# Patient Record
Sex: MALE | Race: WHITE | Employment: OTHER | URBAN - METROPOLITAN AREA
[De-identification: names, ages, dates, MRNs, and addresses within clinical notes are randomized per-mention and may not be internally consistent; named-entity substitution may affect disease eponyms.]

---

## 2017-01-03 ENCOUNTER — PROCEDURE VISIT (OUTPATIENT)
Dept: PHYSICAL MEDICINE AND REHAB | Age: 72
End: 2017-01-03

## 2017-01-03 DIAGNOSIS — M79.10 MYALGIA: Primary | ICD-10-CM

## 2017-01-03 PROCEDURE — 20552 NJX 1/MLT TRIGGER POINT 1/2: CPT | Performed by: PHYSICAL MEDICINE & REHABILITATION

## 2017-01-10 DIAGNOSIS — M79.10 MYALGIA: ICD-10-CM

## 2017-01-10 DIAGNOSIS — M79.641 PAIN IN BOTH HANDS: ICD-10-CM

## 2017-01-10 DIAGNOSIS — M54.40 CHRONIC BILATERAL LOW BACK PAIN WITH SCIATICA, SCIATICA LATERALITY UNSPECIFIED: ICD-10-CM

## 2017-01-10 DIAGNOSIS — M79.642 PAIN IN BOTH HANDS: ICD-10-CM

## 2017-01-10 DIAGNOSIS — G89.29 CHRONIC BILATERAL LOW BACK PAIN WITH SCIATICA, SCIATICA LATERALITY UNSPECIFIED: ICD-10-CM

## 2017-01-10 RX ORDER — IBUPROFEN 800 MG/1
TABLET ORAL
Qty: 90 TABLET | Refills: 0 | Status: SHIPPED | OUTPATIENT
Start: 2017-01-10 | End: 2017-02-13 | Stop reason: SDUPTHER

## 2017-01-24 ENCOUNTER — OFFICE VISIT (OUTPATIENT)
Dept: PHYSICAL MEDICINE AND REHAB | Age: 72
End: 2017-01-24

## 2017-01-24 VITALS
SYSTOLIC BLOOD PRESSURE: 168 MMHG | WEIGHT: 190 LBS | BODY MASS INDEX: 28.79 KG/M2 | HEIGHT: 68 IN | DIASTOLIC BLOOD PRESSURE: 90 MMHG

## 2017-01-24 DIAGNOSIS — M79.10 MYALGIA: ICD-10-CM

## 2017-01-24 DIAGNOSIS — M70.60 GREATER TROCHANTERIC BURSITIS, UNSPECIFIED LATERALITY: Chronic | ICD-10-CM

## 2017-01-24 DIAGNOSIS — M51.36 DDD (DEGENERATIVE DISC DISEASE), LUMBAR: ICD-10-CM

## 2017-01-24 DIAGNOSIS — M50.20 DISPLACEMENT OF CERVICAL INTERVERTEBRAL DISC WITHOUT MYELOPATHY: ICD-10-CM

## 2017-01-24 DIAGNOSIS — M51.26 DISPLACEMENT OF LUMBAR INTERVERTEBRAL DISC WITHOUT MYELOPATHY: Primary | ICD-10-CM

## 2017-01-24 DIAGNOSIS — Z79.899 HIGH RISK MEDICATION USE: ICD-10-CM

## 2017-01-24 DIAGNOSIS — Z79.899 HIGH RISK MEDICATION USE: Chronic | ICD-10-CM

## 2017-01-24 DIAGNOSIS — M54.12 CERVICAL RADICULOPATHY AT C8: ICD-10-CM

## 2017-01-24 PROCEDURE — 1123F ACP DISCUSS/DSCN MKR DOCD: CPT | Performed by: PHYSICAL MEDICINE & REHABILITATION

## 2017-01-24 PROCEDURE — 4040F PNEUMOC VAC/ADMIN/RCVD: CPT | Performed by: PHYSICAL MEDICINE & REHABILITATION

## 2017-01-24 PROCEDURE — G8427 DOCREV CUR MEDS BY ELIG CLIN: HCPCS | Performed by: PHYSICAL MEDICINE & REHABILITATION

## 2017-01-24 PROCEDURE — 99214 OFFICE O/P EST MOD 30 MIN: CPT | Performed by: PHYSICAL MEDICINE & REHABILITATION

## 2017-01-24 PROCEDURE — 3017F COLORECTAL CA SCREEN DOC REV: CPT | Performed by: PHYSICAL MEDICINE & REHABILITATION

## 2017-01-24 PROCEDURE — 1036F TOBACCO NON-USER: CPT | Performed by: PHYSICAL MEDICINE & REHABILITATION

## 2017-01-24 PROCEDURE — G8484 FLU IMMUNIZE NO ADMIN: HCPCS | Performed by: PHYSICAL MEDICINE & REHABILITATION

## 2017-01-24 PROCEDURE — G8420 CALC BMI NORM PARAMETERS: HCPCS | Performed by: PHYSICAL MEDICINE & REHABILITATION

## 2017-01-24 RX ORDER — METAXALONE 800 MG/1
800 TABLET ORAL 3 TIMES DAILY PRN
Qty: 90 TABLET | Refills: 1 | Status: SHIPPED | OUTPATIENT
Start: 2017-01-24 | End: 2017-02-28

## 2017-01-24 RX ORDER — OXYCODONE AND ACETAMINOPHEN 10; 325 MG/1; MG/1
TABLET ORAL
Qty: 60 TABLET | Refills: 0 | Status: SHIPPED | OUTPATIENT
Start: 2017-01-24 | End: 2017-01-24 | Stop reason: SDUPTHER

## 2017-01-24 RX ORDER — OXYCODONE AND ACETAMINOPHEN 10; 325 MG/1; MG/1
TABLET ORAL
Qty: 90 TABLET | Refills: 0 | Status: SHIPPED | OUTPATIENT
Start: 2017-01-24 | End: 2017-02-21 | Stop reason: SDUPTHER

## 2017-01-24 ASSESSMENT — ENCOUNTER SYMPTOMS
DIARRHEA: 0
NAUSEA: 0
CONSTIPATION: 0
VOMITING: 0
EYES NEGATIVE: 1
BACK PAIN: 1
ALLERGIC/IMMUNOLOGIC NEGATIVE: 1
RESPIRATORY NEGATIVE: 1

## 2017-01-27 ENCOUNTER — TELEPHONE (OUTPATIENT)
Dept: PHYSICAL MEDICINE AND REHAB | Age: 72
End: 2017-01-27

## 2017-01-28 ENCOUNTER — OFFICE VISIT (OUTPATIENT)
Dept: PHYSICAL MEDICINE AND REHAB | Age: 72
End: 2017-01-28

## 2017-01-28 DIAGNOSIS — M47.817 LUMBOSACRAL SPONDYLOSIS WITHOUT MYELOPATHY: Primary | ICD-10-CM

## 2017-01-28 DIAGNOSIS — M47.812 CERVICAL SPONDYLOSIS WITHOUT MYELOPATHY: ICD-10-CM

## 2017-01-28 DIAGNOSIS — M54.12 C7 RADICULOPATHY: ICD-10-CM

## 2017-01-28 DIAGNOSIS — M51.26 DISPLACEMENT OF LUMBAR INTERVERTEBRAL DISC WITHOUT MYELOPATHY: ICD-10-CM

## 2017-01-28 DIAGNOSIS — M54.12 CERVICAL RADICULOPATHY AT C8: ICD-10-CM

## 2017-01-28 DIAGNOSIS — M50.20 DISPLACEMENT OF CERVICAL INTERVERTEBRAL DISC WITHOUT MYELOPATHY: ICD-10-CM

## 2017-01-28 PROCEDURE — 95886 MUSC TEST DONE W/N TEST COMP: CPT | Performed by: PHYSICAL MEDICINE & REHABILITATION

## 2017-01-28 PROCEDURE — 95912 NRV CNDJ TEST 11-12 STUDIES: CPT | Performed by: PHYSICAL MEDICINE & REHABILITATION

## 2017-01-28 PROCEDURE — 1036F TOBACCO NON-USER: CPT | Performed by: PHYSICAL MEDICINE & REHABILITATION

## 2017-01-30 RX ORDER — CYCLOBENZAPRINE HCL 10 MG
TABLET ORAL
Qty: 60 TABLET | Refills: 1 | Status: SHIPPED | OUTPATIENT
Start: 2017-01-30 | End: 2017-02-21 | Stop reason: SDUPTHER

## 2017-02-09 ENCOUNTER — PROCEDURE VISIT (OUTPATIENT)
Dept: PHYSICAL MEDICINE AND REHAB | Age: 72
End: 2017-02-09

## 2017-02-09 VITALS — SYSTOLIC BLOOD PRESSURE: 122 MMHG | DIASTOLIC BLOOD PRESSURE: 70 MMHG

## 2017-02-09 DIAGNOSIS — M25.522 ELBOW PAIN, LEFT: Primary | ICD-10-CM

## 2017-02-09 PROCEDURE — 20605 DRAIN/INJ JOINT/BURSA W/O US: CPT | Performed by: PHYSICAL MEDICINE & REHABILITATION

## 2017-02-09 PROCEDURE — 1036F TOBACCO NON-USER: CPT | Performed by: PHYSICAL MEDICINE & REHABILITATION

## 2017-02-13 DIAGNOSIS — M79.642 PAIN IN BOTH HANDS: ICD-10-CM

## 2017-02-13 DIAGNOSIS — M79.10 MYALGIA: ICD-10-CM

## 2017-02-13 DIAGNOSIS — G89.29 CHRONIC BILATERAL LOW BACK PAIN WITH SCIATICA, SCIATICA LATERALITY UNSPECIFIED: ICD-10-CM

## 2017-02-13 DIAGNOSIS — M79.641 PAIN IN BOTH HANDS: ICD-10-CM

## 2017-02-13 DIAGNOSIS — M54.40 CHRONIC BILATERAL LOW BACK PAIN WITH SCIATICA, SCIATICA LATERALITY UNSPECIFIED: ICD-10-CM

## 2017-02-14 RX ORDER — IBUPROFEN 800 MG/1
TABLET ORAL
Qty: 90 TABLET | Refills: 0 | Status: SHIPPED | OUTPATIENT
Start: 2017-02-14 | End: 2017-05-10 | Stop reason: SDUPTHER

## 2017-02-16 PROBLEM — G56.20 TARDY ULNAR NERVE PALSY: Status: ACTIVE | Noted: 2017-02-16

## 2017-02-21 DIAGNOSIS — M51.36 DDD (DEGENERATIVE DISC DISEASE), LUMBAR: ICD-10-CM

## 2017-02-21 DIAGNOSIS — Z79.899 HIGH RISK MEDICATION USE: Chronic | ICD-10-CM

## 2017-02-21 DIAGNOSIS — M54.12 CERVICAL RADICULOPATHY AT C8: ICD-10-CM

## 2017-02-21 DIAGNOSIS — M50.20 DISPLACEMENT OF CERVICAL INTERVERTEBRAL DISC WITHOUT MYELOPATHY: ICD-10-CM

## 2017-02-21 DIAGNOSIS — Z79.899 HIGH RISK MEDICATION USE: ICD-10-CM

## 2017-02-21 RX ORDER — CYCLOBENZAPRINE HCL 10 MG
TABLET ORAL
Qty: 60 TABLET | Refills: 1 | Status: SHIPPED | OUTPATIENT
Start: 2017-02-21 | End: 2017-04-24 | Stop reason: SDUPTHER

## 2017-02-21 RX ORDER — OXYCODONE AND ACETAMINOPHEN 10; 325 MG/1; MG/1
TABLET ORAL
Qty: 90 TABLET | Refills: 0 | Status: SHIPPED | OUTPATIENT
Start: 2017-02-21 | End: 2017-02-28 | Stop reason: SDUPTHER

## 2017-02-28 ENCOUNTER — OFFICE VISIT (OUTPATIENT)
Dept: PHYSICAL MEDICINE AND REHAB | Age: 72
End: 2017-02-28

## 2017-02-28 VITALS
DIASTOLIC BLOOD PRESSURE: 80 MMHG | SYSTOLIC BLOOD PRESSURE: 130 MMHG | BODY MASS INDEX: 28.04 KG/M2 | HEIGHT: 68 IN | WEIGHT: 185 LBS

## 2017-02-28 DIAGNOSIS — Z79.899 HIGH RISK MEDICATION USE: ICD-10-CM

## 2017-02-28 DIAGNOSIS — M50.20 DISPLACEMENT OF CERVICAL INTERVERTEBRAL DISC WITHOUT MYELOPATHY: ICD-10-CM

## 2017-02-28 DIAGNOSIS — M70.60 GREATER TROCHANTERIC BURSITIS, UNSPECIFIED LATERALITY: Primary | Chronic | ICD-10-CM

## 2017-02-28 DIAGNOSIS — G56.22 ULNAR NEUROPATHY OF LEFT UPPER EXTREMITY: ICD-10-CM

## 2017-02-28 DIAGNOSIS — M47.817 LUMBOSACRAL SPONDYLOSIS WITHOUT MYELOPATHY: ICD-10-CM

## 2017-02-28 DIAGNOSIS — M79.641 PAIN IN BOTH HANDS: ICD-10-CM

## 2017-02-28 DIAGNOSIS — M79.642 PAIN IN BOTH HANDS: ICD-10-CM

## 2017-02-28 DIAGNOSIS — Z79.899 HIGH RISK MEDICATION USE: Chronic | ICD-10-CM

## 2017-02-28 DIAGNOSIS — M51.36 DDD (DEGENERATIVE DISC DISEASE), LUMBAR: ICD-10-CM

## 2017-02-28 DIAGNOSIS — M54.12 CERVICAL RADICULOPATHY AT C8: ICD-10-CM

## 2017-02-28 DIAGNOSIS — M79.10 MYALGIA: ICD-10-CM

## 2017-02-28 DIAGNOSIS — G56.20: ICD-10-CM

## 2017-02-28 DIAGNOSIS — M54.12 C7 RADICULOPATHY: ICD-10-CM

## 2017-02-28 PROCEDURE — G8484 FLU IMMUNIZE NO ADMIN: HCPCS | Performed by: PHYSICAL MEDICINE & REHABILITATION

## 2017-02-28 PROCEDURE — 99214 OFFICE O/P EST MOD 30 MIN: CPT | Performed by: PHYSICAL MEDICINE & REHABILITATION

## 2017-02-28 PROCEDURE — 1123F ACP DISCUSS/DSCN MKR DOCD: CPT | Performed by: PHYSICAL MEDICINE & REHABILITATION

## 2017-02-28 PROCEDURE — 3017F COLORECTAL CA SCREEN DOC REV: CPT | Performed by: PHYSICAL MEDICINE & REHABILITATION

## 2017-02-28 PROCEDURE — 4040F PNEUMOC VAC/ADMIN/RCVD: CPT | Performed by: PHYSICAL MEDICINE & REHABILITATION

## 2017-02-28 PROCEDURE — G8420 CALC BMI NORM PARAMETERS: HCPCS | Performed by: PHYSICAL MEDICINE & REHABILITATION

## 2017-02-28 PROCEDURE — 4004F PT TOBACCO SCREEN RCVD TLK: CPT | Performed by: PHYSICAL MEDICINE & REHABILITATION

## 2017-02-28 PROCEDURE — G8427 DOCREV CUR MEDS BY ELIG CLIN: HCPCS | Performed by: PHYSICAL MEDICINE & REHABILITATION

## 2017-02-28 RX ORDER — OXYCODONE AND ACETAMINOPHEN 10; 325 MG/1; MG/1
TABLET ORAL
Qty: 90 TABLET | Refills: 0 | Status: SHIPPED | OUTPATIENT
Start: 2017-02-28 | End: 2017-04-24 | Stop reason: SDUPTHER

## 2017-02-28 ASSESSMENT — ENCOUNTER SYMPTOMS
RESPIRATORY NEGATIVE: 1
VOMITING: 0
EYES NEGATIVE: 1
DIARRHEA: 0
ALLERGIC/IMMUNOLOGIC NEGATIVE: 1
BACK PAIN: 1
ABDOMINAL PAIN: 0
NAUSEA: 0
CONSTIPATION: 0

## 2017-03-17 ENCOUNTER — HOSPITAL ENCOUNTER (OUTPATIENT)
Dept: PREADMISSION TESTING | Age: 72
Discharge: HOME | End: 2017-03-17
Payer: MEDICARE

## 2017-03-17 ENCOUNTER — ANESTHESIA EVENT (OUTPATIENT)
Dept: OPERATING ROOM | Age: 72
End: 2017-03-17
Payer: MEDICARE

## 2017-03-17 VITALS
DIASTOLIC BLOOD PRESSURE: 90 MMHG | BODY MASS INDEX: 30.23 KG/M2 | TEMPERATURE: 97 F | RESPIRATION RATE: 14 BRPM | HEART RATE: 72 BPM | HEIGHT: 67 IN | SYSTOLIC BLOOD PRESSURE: 177 MMHG | OXYGEN SATURATION: 95 % | WEIGHT: 192.6 LBS

## 2017-03-17 LAB
ANION GAP SERPL CALCULATED.3IONS-SCNC: 15 MEQ/L (ref 7–13)
BUN BLDV-MCNC: 11 MG/DL (ref 8–23)
CALCIUM SERPL-MCNC: 9.3 MG/DL (ref 8.6–10.2)
CHLORIDE BLD-SCNC: 105 MEQ/L (ref 98–107)
CO2: 22 MEQ/L (ref 22–29)
CREAT SERPL-MCNC: 0.66 MG/DL (ref 0.7–1.2)
EKG ATRIAL RATE: 57 BPM
EKG P AXIS: 25 DEGREES
EKG P-R INTERVAL: 168 MS
EKG Q-T INTERVAL: 460 MS
EKG QRS DURATION: 146 MS
EKG QTC CALCULATION (BAZETT): 447 MS
EKG R AXIS: -21 DEGREES
EKG T AXIS: 3 DEGREES
EKG VENTRICULAR RATE: 57 BPM
GFR AFRICAN AMERICAN: >60
GFR NON-AFRICAN AMERICAN: >60
GLUCOSE BLD-MCNC: 97 MG/DL (ref 74–109)
HCT VFR BLD CALC: 41 % (ref 42–52)
HEMOGLOBIN: 13.8 G/DL (ref 14–18)
MCH RBC QN AUTO: 30.2 PG (ref 27–31.3)
MCHC RBC AUTO-ENTMCNC: 33.7 % (ref 33–37)
MCV RBC AUTO: 89.9 FL (ref 80–100)
PDW BLD-RTO: 13.7 % (ref 11.5–14.5)
PLATELET # BLD: 177 K/UL (ref 130–400)
POTASSIUM SERPL-SCNC: 3.9 MEQ/L (ref 3.5–5.1)
RBC # BLD: 4.56 M/UL (ref 4.7–6.1)
SODIUM BLD-SCNC: 142 MEQ/L (ref 132–144)
WBC # BLD: 5.4 K/UL (ref 4.8–10.8)

## 2017-03-17 PROCEDURE — 85027 COMPLETE CBC AUTOMATED: CPT

## 2017-03-17 PROCEDURE — 80048 BASIC METABOLIC PNL TOTAL CA: CPT

## 2017-03-17 PROCEDURE — 93005 ELECTROCARDIOGRAM TRACING: CPT

## 2017-03-17 RX ORDER — LIDOCAINE HYDROCHLORIDE 10 MG/ML
1 INJECTION, SOLUTION EPIDURAL; INFILTRATION; INTRACAUDAL; PERINEURAL
Status: CANCELLED | OUTPATIENT
Start: 2017-03-17 | End: 2017-03-17

## 2017-03-17 RX ORDER — SODIUM CHLORIDE, SODIUM LACTATE, POTASSIUM CHLORIDE, CALCIUM CHLORIDE 600; 310; 30; 20 MG/100ML; MG/100ML; MG/100ML; MG/100ML
INJECTION, SOLUTION INTRAVENOUS CONTINUOUS
Status: CANCELLED | OUTPATIENT
Start: 2017-03-17

## 2017-03-17 RX ORDER — SODIUM CHLORIDE, SODIUM LACTATE, POTASSIUM CHLORIDE, CALCIUM CHLORIDE 600; 310; 30; 20 MG/100ML; MG/100ML; MG/100ML; MG/100ML
INJECTION, SOLUTION INTRAVENOUS CONTINUOUS
Status: CANCELLED | OUTPATIENT
Start: 2017-03-20

## 2017-03-17 RX ORDER — SODIUM CHLORIDE 0.9 % (FLUSH) 0.9 %
10 SYRINGE (ML) INJECTION PRN
Status: CANCELLED | OUTPATIENT
Start: 2017-03-17

## 2017-03-17 RX ORDER — SODIUM CHLORIDE 0.9 % (FLUSH) 0.9 %
10 SYRINGE (ML) INJECTION EVERY 12 HOURS SCHEDULED
Status: CANCELLED | OUTPATIENT
Start: 2017-03-17

## 2017-03-20 ENCOUNTER — HOSPITAL ENCOUNTER (OUTPATIENT)
Age: 72
Discharge: HOME | End: 2017-03-20
Attending: NEUROLOGICAL SURGERY | Admitting: NEUROLOGICAL SURGERY
Payer: MEDICARE

## 2017-03-20 ENCOUNTER — ANESTHESIA (OUTPATIENT)
Dept: OPERATING ROOM | Age: 72
End: 2017-03-20
Payer: MEDICARE

## 2017-03-20 VITALS — SYSTOLIC BLOOD PRESSURE: 108 MMHG | DIASTOLIC BLOOD PRESSURE: 57 MMHG | OXYGEN SATURATION: 100 %

## 2017-03-20 VITALS
HEART RATE: 70 BPM | DIASTOLIC BLOOD PRESSURE: 68 MMHG | OXYGEN SATURATION: 94 % | RESPIRATION RATE: 16 BRPM | SYSTOLIC BLOOD PRESSURE: 125 MMHG | TEMPERATURE: 97.4 F

## 2017-03-20 PROCEDURE — 7100000001 HC PACU RECOVERY - ADDTL 15 MIN: Performed by: NEUROLOGICAL SURGERY

## 2017-03-20 PROCEDURE — 3600000012 HC SURGERY LEVEL 2 ADDTL 15MIN: Performed by: NEUROLOGICAL SURGERY

## 2017-03-20 PROCEDURE — 7100000010 HC PHASE II RECOVERY - FIRST 15 MIN: Performed by: NEUROLOGICAL SURGERY

## 2017-03-20 PROCEDURE — 2500000003 HC RX 250 WO HCPCS: Performed by: NEUROLOGICAL SURGERY

## 2017-03-20 PROCEDURE — 3600000002 HC SURGERY LEVEL 2 BASE: Performed by: NEUROLOGICAL SURGERY

## 2017-03-20 PROCEDURE — 7100000011 HC PHASE II RECOVERY - ADDTL 15 MIN: Performed by: NEUROLOGICAL SURGERY

## 2017-03-20 PROCEDURE — 6360000002 HC RX W HCPCS: Performed by: NURSE ANESTHETIST, CERTIFIED REGISTERED

## 2017-03-20 PROCEDURE — 2580000003 HC RX 258: Performed by: NEUROLOGICAL SURGERY

## 2017-03-20 PROCEDURE — 2500000003 HC RX 250 WO HCPCS: Performed by: STUDENT IN AN ORGANIZED HEALTH CARE EDUCATION/TRAINING PROGRAM

## 2017-03-20 PROCEDURE — 2500000003 HC RX 250 WO HCPCS: Performed by: NURSE ANESTHETIST, CERTIFIED REGISTERED

## 2017-03-20 PROCEDURE — 6360000002 HC RX W HCPCS: Performed by: ANESTHESIOLOGY

## 2017-03-20 PROCEDURE — 3700000001 HC ADD 15 MINUTES (ANESTHESIA): Performed by: NEUROLOGICAL SURGERY

## 2017-03-20 PROCEDURE — 6360000002 HC RX W HCPCS: Performed by: NEUROLOGICAL SURGERY

## 2017-03-20 PROCEDURE — 3700000000 HC ANESTHESIA ATTENDED CARE: Performed by: NEUROLOGICAL SURGERY

## 2017-03-20 PROCEDURE — 7100000000 HC PACU RECOVERY - FIRST 15 MIN: Performed by: NEUROLOGICAL SURGERY

## 2017-03-20 PROCEDURE — 2580000003 HC RX 258: Performed by: NURSE ANESTHETIST, CERTIFIED REGISTERED

## 2017-03-20 RX ORDER — LOSARTAN POTASSIUM 50 MG/1
100 TABLET ORAL DAILY
Status: DISCONTINUED | OUTPATIENT
Start: 2017-03-20 | End: 2017-03-20 | Stop reason: HOSPADM

## 2017-03-20 RX ORDER — ACETAMINOPHEN 325 MG/1
650 TABLET ORAL EVERY 4 HOURS PRN
Status: DISCONTINUED | OUTPATIENT
Start: 2017-03-20 | End: 2017-03-20 | Stop reason: HOSPADM

## 2017-03-20 RX ORDER — OXYCODONE HYDROCHLORIDE AND ACETAMINOPHEN 5; 325 MG/1; MG/1
2 TABLET ORAL EVERY 4 HOURS PRN
Status: DISCONTINUED | OUTPATIENT
Start: 2017-03-20 | End: 2017-03-20 | Stop reason: HOSPADM

## 2017-03-20 RX ORDER — ONDANSETRON 2 MG/ML
4 INJECTION INTRAMUSCULAR; INTRAVENOUS
Status: DISCONTINUED | OUTPATIENT
Start: 2017-03-20 | End: 2017-03-20 | Stop reason: HOSPADM

## 2017-03-20 RX ORDER — SODIUM CHLORIDE, SODIUM LACTATE, POTASSIUM CHLORIDE, CALCIUM CHLORIDE 600; 310; 30; 20 MG/100ML; MG/100ML; MG/100ML; MG/100ML
INJECTION, SOLUTION INTRAVENOUS CONTINUOUS PRN
Status: DISCONTINUED | OUTPATIENT
Start: 2017-03-20 | End: 2017-03-20 | Stop reason: SDUPTHER

## 2017-03-20 RX ORDER — EPHEDRINE SULFATE 50 MG/ML
INJECTION, SOLUTION INTRAVENOUS PRN
Status: DISCONTINUED | OUTPATIENT
Start: 2017-03-20 | End: 2017-03-20 | Stop reason: SDUPTHER

## 2017-03-20 RX ORDER — FENTANYL CITRATE 50 UG/ML
50 INJECTION, SOLUTION INTRAMUSCULAR; INTRAVENOUS EVERY 5 MIN PRN
Status: DISCONTINUED | OUTPATIENT
Start: 2017-03-20 | End: 2017-03-20 | Stop reason: HOSPADM

## 2017-03-20 RX ORDER — ROCURONIUM BROMIDE 10 MG/ML
INJECTION, SOLUTION INTRAVENOUS PRN
Status: DISCONTINUED | OUTPATIENT
Start: 2017-03-20 | End: 2017-03-20 | Stop reason: SDUPTHER

## 2017-03-20 RX ORDER — DEXAMETHASONE SODIUM PHOSPHATE 4 MG/ML
INJECTION, SOLUTION INTRA-ARTICULAR; INTRALESIONAL; INTRAMUSCULAR; INTRAVENOUS; SOFT TISSUE PRN
Status: DISCONTINUED | OUTPATIENT
Start: 2017-03-20 | End: 2017-03-20 | Stop reason: SDUPTHER

## 2017-03-20 RX ORDER — MIDAZOLAM HYDROCHLORIDE 1 MG/ML
INJECTION INTRAMUSCULAR; INTRAVENOUS PRN
Status: DISCONTINUED | OUTPATIENT
Start: 2017-03-20 | End: 2017-03-20 | Stop reason: SDUPTHER

## 2017-03-20 RX ORDER — SUCCINYLCHOLINE CHLORIDE 20 MG/ML
INJECTION INTRAMUSCULAR; INTRAVENOUS PRN
Status: DISCONTINUED | OUTPATIENT
Start: 2017-03-20 | End: 2017-03-20 | Stop reason: SDUPTHER

## 2017-03-20 RX ORDER — ONDANSETRON 2 MG/ML
INJECTION INTRAMUSCULAR; INTRAVENOUS PRN
Status: DISCONTINUED | OUTPATIENT
Start: 2017-03-20 | End: 2017-03-20 | Stop reason: SDUPTHER

## 2017-03-20 RX ORDER — SODIUM CHLORIDE, SODIUM LACTATE, POTASSIUM CHLORIDE, CALCIUM CHLORIDE 600; 310; 30; 20 MG/100ML; MG/100ML; MG/100ML; MG/100ML
INJECTION, SOLUTION INTRAVENOUS CONTINUOUS
Status: DISCONTINUED | OUTPATIENT
Start: 2017-03-20 | End: 2017-03-20

## 2017-03-20 RX ORDER — ONDANSETRON 2 MG/ML
4 INJECTION INTRAMUSCULAR; INTRAVENOUS EVERY 6 HOURS PRN
Status: DISCONTINUED | OUTPATIENT
Start: 2017-03-20 | End: 2017-03-20 | Stop reason: HOSPADM

## 2017-03-20 RX ORDER — OXYCODONE AND ACETAMINOPHEN 10; 325 MG/1; MG/1
1 TABLET ORAL 4 TIMES DAILY PRN
Status: DISCONTINUED | OUTPATIENT
Start: 2017-03-20 | End: 2017-03-20 | Stop reason: HOSPADM

## 2017-03-20 RX ORDER — LIDOCAINE HYDROCHLORIDE 20 MG/ML
INJECTION, SOLUTION INFILTRATION; PERINEURAL PRN
Status: DISCONTINUED | OUTPATIENT
Start: 2017-03-20 | End: 2017-03-20

## 2017-03-20 RX ORDER — OXYCODONE HYDROCHLORIDE AND ACETAMINOPHEN 5; 325 MG/1; MG/1
1 TABLET ORAL EVERY 4 HOURS PRN
Status: DISCONTINUED | OUTPATIENT
Start: 2017-03-20 | End: 2017-03-20 | Stop reason: HOSPADM

## 2017-03-20 RX ORDER — FENTANYL CITRATE 50 UG/ML
INJECTION, SOLUTION INTRAMUSCULAR; INTRAVENOUS PRN
Status: DISCONTINUED | OUTPATIENT
Start: 2017-03-20 | End: 2017-03-20 | Stop reason: SDUPTHER

## 2017-03-20 RX ORDER — PROMETHAZINE HYDROCHLORIDE 25 MG/ML
25 INJECTION, SOLUTION INTRAMUSCULAR; INTRAVENOUS
Status: DISCONTINUED | OUTPATIENT
Start: 2017-03-20 | End: 2017-03-20 | Stop reason: HOSPADM

## 2017-03-20 RX ORDER — LIDOCAINE HYDROCHLORIDE AND EPINEPHRINE BITARTRATE 20; .01 MG/ML; MG/ML
INJECTION, SOLUTION SUBCUTANEOUS PRN
Status: DISCONTINUED | OUTPATIENT
Start: 2017-03-20 | End: 2017-03-20 | Stop reason: HOSPADM

## 2017-03-20 RX ORDER — SODIUM CHLORIDE 0.9 % (FLUSH) 0.9 %
10 SYRINGE (ML) INJECTION PRN
Status: DISCONTINUED | OUTPATIENT
Start: 2017-03-20 | End: 2017-03-20 | Stop reason: HOSPADM

## 2017-03-20 RX ORDER — LIDOCAINE HYDROCHLORIDE 10 MG/ML
1 INJECTION, SOLUTION EPIDURAL; INFILTRATION; INTRACAUDAL; PERINEURAL
Status: COMPLETED | OUTPATIENT
Start: 2017-03-20 | End: 2017-03-20

## 2017-03-20 RX ORDER — SODIUM CHLORIDE 0.9 % (FLUSH) 0.9 %
10 SYRINGE (ML) INJECTION EVERY 12 HOURS SCHEDULED
Status: DISCONTINUED | OUTPATIENT
Start: 2017-03-20 | End: 2017-03-20 | Stop reason: HOSPADM

## 2017-03-20 RX ORDER — SODIUM CHLORIDE, SODIUM LACTATE, POTASSIUM CHLORIDE, CALCIUM CHLORIDE 600; 310; 30; 20 MG/100ML; MG/100ML; MG/100ML; MG/100ML
INJECTION, SOLUTION INTRAVENOUS CONTINUOUS
Status: DISCONTINUED | OUTPATIENT
Start: 2017-03-20 | End: 2017-03-20 | Stop reason: HOSPADM

## 2017-03-20 RX ORDER — MAGNESIUM HYDROXIDE 1200 MG/15ML
LIQUID ORAL CONTINUOUS PRN
Status: DISCONTINUED | OUTPATIENT
Start: 2017-03-20 | End: 2017-03-20 | Stop reason: HOSPADM

## 2017-03-20 RX ORDER — LIDOCAINE HYDROCHLORIDE 20 MG/ML
INJECTION, SOLUTION INFILTRATION; PERINEURAL PRN
Status: DISCONTINUED | OUTPATIENT
Start: 2017-03-20 | End: 2017-03-20 | Stop reason: SDUPTHER

## 2017-03-20 RX ORDER — PROPOFOL 10 MG/ML
INJECTION, EMULSION INTRAVENOUS PRN
Status: DISCONTINUED | OUTPATIENT
Start: 2017-03-20 | End: 2017-03-20 | Stop reason: SDUPTHER

## 2017-03-20 RX ADMIN — EPHEDRINE SULFATE 10 MG: 50 INJECTION, SOLUTION INTRAMUSCULAR; INTRAVENOUS; SUBCUTANEOUS at 14:25

## 2017-03-20 RX ADMIN — HYDROMORPHONE HYDROCHLORIDE 0.5 MG: 1 INJECTION, SOLUTION INTRAMUSCULAR; INTRAVENOUS; SUBCUTANEOUS at 15:05

## 2017-03-20 RX ADMIN — SUCCINYLCHOLINE CHLORIDE 120 MG: 20 INJECTION, SOLUTION INTRAMUSCULAR; INTRAVENOUS at 13:35

## 2017-03-20 RX ADMIN — DEXAMETHASONE SODIUM PHOSPHATE 4 MG: 4 INJECTION, SOLUTION INTRAMUSCULAR; INTRAVENOUS at 14:00

## 2017-03-20 RX ADMIN — ONDANSETRON 4 MG: 2 INJECTION, SOLUTION INTRAMUSCULAR; INTRAVENOUS at 14:15

## 2017-03-20 RX ADMIN — ROCURONIUM BROMIDE 20 MG: 10 SOLUTION INTRAVENOUS at 13:40

## 2017-03-20 RX ADMIN — HYDROMORPHONE HYDROCHLORIDE 0.5 MG: 1 INJECTION, SOLUTION INTRAMUSCULAR; INTRAVENOUS; SUBCUTANEOUS at 14:55

## 2017-03-20 RX ADMIN — MIDAZOLAM HYDROCHLORIDE 2 MG: 1 INJECTION, SOLUTION INTRAMUSCULAR; INTRAVENOUS at 13:25

## 2017-03-20 RX ADMIN — EPHEDRINE SULFATE 5 MG: 50 INJECTION, SOLUTION INTRAMUSCULAR; INTRAVENOUS; SUBCUTANEOUS at 13:51

## 2017-03-20 RX ADMIN — SODIUM CHLORIDE, POTASSIUM CHLORIDE, SODIUM LACTATE AND CALCIUM CHLORIDE: 600; 310; 30; 20 INJECTION, SOLUTION INTRAVENOUS at 13:25

## 2017-03-20 RX ADMIN — SODIUM CHLORIDE, POTASSIUM CHLORIDE, SODIUM LACTATE AND CALCIUM CHLORIDE: 600; 310; 30; 20 INJECTION, SOLUTION INTRAVENOUS at 11:25

## 2017-03-20 RX ADMIN — EPHEDRINE SULFATE 10 MG: 50 INJECTION, SOLUTION INTRAMUSCULAR; INTRAVENOUS; SUBCUTANEOUS at 13:50

## 2017-03-20 RX ADMIN — FENTANYL CITRATE 50 MCG: 50 INJECTION, SOLUTION INTRAMUSCULAR; INTRAVENOUS at 13:35

## 2017-03-20 RX ADMIN — SODIUM CHLORIDE, POTASSIUM CHLORIDE, SODIUM LACTATE AND CALCIUM CHLORIDE: 600; 310; 30; 20 INJECTION, SOLUTION INTRAVENOUS at 14:00

## 2017-03-20 RX ADMIN — PROPOFOL 150 MG: 10 INJECTION, EMULSION INTRAVENOUS at 13:35

## 2017-03-20 RX ADMIN — EPHEDRINE SULFATE 5 MG: 50 INJECTION, SOLUTION INTRAMUSCULAR; INTRAVENOUS; SUBCUTANEOUS at 13:56

## 2017-03-20 RX ADMIN — SUGAMMADEX 174 MG: 100 INJECTION, SOLUTION INTRAVENOUS at 14:30

## 2017-03-20 RX ADMIN — EPHEDRINE SULFATE 10 MG: 50 INJECTION, SOLUTION INTRAMUSCULAR; INTRAVENOUS; SUBCUTANEOUS at 13:55

## 2017-03-20 RX ADMIN — FENTANYL CITRATE 50 MCG: 50 INJECTION, SOLUTION INTRAMUSCULAR; INTRAVENOUS at 14:35

## 2017-03-20 RX ADMIN — Medication 2 G: at 13:30

## 2017-03-20 RX ADMIN — LIDOCAINE HYDROCHLORIDE 1 ML: 10 INJECTION, SOLUTION EPIDURAL; INFILTRATION; INTRACAUDAL; PERINEURAL at 11:25

## 2017-03-20 RX ADMIN — ROCURONIUM BROMIDE 10 MG: 10 SOLUTION INTRAVENOUS at 13:35

## 2017-03-20 RX ADMIN — LIDOCAINE HYDROCHLORIDE 100 MG: 20 INJECTION, SOLUTION INFILTRATION; PERINEURAL at 13:35

## 2017-03-20 ASSESSMENT — PAIN SCALES - GENERAL
PAINLEVEL_OUTOF10: 6
PAINLEVEL_OUTOF10: 7
PAINLEVEL_OUTOF10: 3
PAINLEVEL_OUTOF10: 2
PAINLEVEL_OUTOF10: 4

## 2017-03-20 ASSESSMENT — PAIN DESCRIPTION - PAIN TYPE: TYPE: SURGICAL PAIN

## 2017-03-20 ASSESSMENT — PAIN DESCRIPTION - FREQUENCY: FREQUENCY: INTERMITTENT

## 2017-03-20 ASSESSMENT — PAIN - FUNCTIONAL ASSESSMENT: PAIN_FUNCTIONAL_ASSESSMENT: 0-10

## 2017-03-20 ASSESSMENT — PAIN DESCRIPTION - DESCRIPTORS: DESCRIPTORS: THROBBING

## 2017-03-20 ASSESSMENT — PAIN DESCRIPTION - ORIENTATION: ORIENTATION: LEFT

## 2017-03-20 ASSESSMENT — PAIN DESCRIPTION - LOCATION: LOCATION: ARM

## 2017-03-27 ENCOUNTER — TELEPHONE (OUTPATIENT)
Dept: PHYSICAL MEDICINE AND REHAB | Age: 72
End: 2017-03-27

## 2017-04-24 DIAGNOSIS — M51.36 DDD (DEGENERATIVE DISC DISEASE), LUMBAR: ICD-10-CM

## 2017-04-24 DIAGNOSIS — M50.20 DISPLACEMENT OF CERVICAL INTERVERTEBRAL DISC WITHOUT MYELOPATHY: ICD-10-CM

## 2017-04-24 DIAGNOSIS — M54.12 CERVICAL RADICULOPATHY AT C8: ICD-10-CM

## 2017-04-24 DIAGNOSIS — Z79.899 HIGH RISK MEDICATION USE: ICD-10-CM

## 2017-04-24 DIAGNOSIS — Z79.899 HIGH RISK MEDICATION USE: Chronic | ICD-10-CM

## 2017-04-24 RX ORDER — CYCLOBENZAPRINE HCL 10 MG
TABLET ORAL
Qty: 60 TABLET | Refills: 1 | Status: SHIPPED | OUTPATIENT
Start: 2017-04-24 | End: 2017-05-04

## 2017-04-24 RX ORDER — OXYCODONE AND ACETAMINOPHEN 10; 325 MG/1; MG/1
TABLET ORAL
Qty: 90 TABLET | Refills: 0 | Status: SHIPPED | OUTPATIENT
Start: 2017-04-24 | End: 2017-05-10 | Stop reason: SDUPTHER

## 2017-05-10 ENCOUNTER — OFFICE VISIT (OUTPATIENT)
Dept: PHYSICAL MEDICINE AND REHAB | Age: 72
End: 2017-05-10

## 2017-05-10 VITALS
BODY MASS INDEX: 28.95 KG/M2 | DIASTOLIC BLOOD PRESSURE: 58 MMHG | WEIGHT: 191 LBS | HEIGHT: 68 IN | SYSTOLIC BLOOD PRESSURE: 110 MMHG

## 2017-05-10 DIAGNOSIS — M54.2 NECK PAIN: Primary | ICD-10-CM

## 2017-05-10 DIAGNOSIS — M51.36 DDD (DEGENERATIVE DISC DISEASE), LUMBAR: ICD-10-CM

## 2017-05-10 DIAGNOSIS — M25.512 BILATERAL SHOULDER PAIN, UNSPECIFIED CHRONICITY: ICD-10-CM

## 2017-05-10 DIAGNOSIS — Z79.899 HIGH RISK MEDICATION USE: Chronic | ICD-10-CM

## 2017-05-10 DIAGNOSIS — M50.20 DISPLACEMENT OF CERVICAL INTERVERTEBRAL DISC WITHOUT MYELOPATHY: ICD-10-CM

## 2017-05-10 DIAGNOSIS — M79.641 PAIN IN BOTH HANDS: ICD-10-CM

## 2017-05-10 DIAGNOSIS — M54.12 CERVICAL RADICULOPATHY AT C8: ICD-10-CM

## 2017-05-10 DIAGNOSIS — M25.511 BILATERAL SHOULDER PAIN, UNSPECIFIED CHRONICITY: ICD-10-CM

## 2017-05-10 DIAGNOSIS — M79.10 MYALGIA: ICD-10-CM

## 2017-05-10 DIAGNOSIS — Z79.899 HIGH RISK MEDICATION USE: ICD-10-CM

## 2017-05-10 DIAGNOSIS — G89.29 CHRONIC BILATERAL LOW BACK PAIN WITH SCIATICA, SCIATICA LATERALITY UNSPECIFIED: ICD-10-CM

## 2017-05-10 DIAGNOSIS — M79.642 PAIN IN BOTH HANDS: ICD-10-CM

## 2017-05-10 DIAGNOSIS — M54.40 CHRONIC BILATERAL LOW BACK PAIN WITH SCIATICA, SCIATICA LATERALITY UNSPECIFIED: ICD-10-CM

## 2017-05-10 PROCEDURE — 99214 OFFICE O/P EST MOD 30 MIN: CPT | Performed by: PHYSICAL MEDICINE & REHABILITATION

## 2017-05-10 RX ORDER — IBUPROFEN 800 MG/1
TABLET ORAL
Qty: 90 TABLET | Refills: 0 | Status: SHIPPED | OUTPATIENT
Start: 2017-05-10 | End: 2017-06-21 | Stop reason: SDUPTHER

## 2017-05-10 RX ORDER — OXYCODONE AND ACETAMINOPHEN 10; 325 MG/1; MG/1
TABLET ORAL
Qty: 90 TABLET | Refills: 0 | Status: SHIPPED | OUTPATIENT
Start: 2017-05-10 | End: 2017-06-21 | Stop reason: SDUPTHER

## 2017-05-10 ASSESSMENT — ENCOUNTER SYMPTOMS
SHORTNESS OF BREATH: 0
VOMITING: 0
ABDOMINAL PAIN: 0
WHEEZING: 0
CONSTIPATION: 1
NAUSEA: 0
BACK PAIN: 1
EYES NEGATIVE: 1
ALLERGIC/IMMUNOLOGIC NEGATIVE: 1
DIARRHEA: 0
CHEST TIGHTNESS: 0

## 2017-05-11 ENCOUNTER — OFFICE VISIT (OUTPATIENT)
Dept: FAMILY MEDICINE CLINIC | Age: 72
End: 2017-05-11

## 2017-05-11 VITALS
HEART RATE: 78 BPM | DIASTOLIC BLOOD PRESSURE: 84 MMHG | HEIGHT: 68 IN | SYSTOLIC BLOOD PRESSURE: 132 MMHG | WEIGHT: 192 LBS | BODY MASS INDEX: 29.1 KG/M2 | OXYGEN SATURATION: 97 %

## 2017-05-11 DIAGNOSIS — F41.9 ANXIETY: ICD-10-CM

## 2017-05-11 DIAGNOSIS — M51.36 DDD (DEGENERATIVE DISC DISEASE), LUMBAR: ICD-10-CM

## 2017-05-11 DIAGNOSIS — L98.9 SKIN LESION: ICD-10-CM

## 2017-05-11 DIAGNOSIS — I10 ESSENTIAL HYPERTENSION: ICD-10-CM

## 2017-05-11 DIAGNOSIS — E78.5 HYPERLIPIDEMIA, UNSPECIFIED HYPERLIPIDEMIA TYPE: ICD-10-CM

## 2017-05-11 DIAGNOSIS — K21.9 GASTROESOPHAGEAL REFLUX DISEASE WITHOUT ESOPHAGITIS: Primary | ICD-10-CM

## 2017-05-11 DIAGNOSIS — Z11.59 NEED FOR HEPATITIS C SCREENING TEST: ICD-10-CM

## 2017-05-11 PROCEDURE — 99214 OFFICE O/P EST MOD 30 MIN: CPT | Performed by: FAMILY MEDICINE

## 2017-05-11 PROCEDURE — G8510 SCR DEP NEG, NO PLAN REQD: HCPCS | Performed by: FAMILY MEDICINE

## 2017-05-11 PROCEDURE — 3288F FALL RISK ASSESSMENT DOCD: CPT | Performed by: FAMILY MEDICINE

## 2017-05-11 RX ORDER — ATORVASTATIN CALCIUM 20 MG/1
TABLET, FILM COATED ORAL
Qty: 90 TABLET | Refills: 2 | Status: SHIPPED | OUTPATIENT
Start: 2017-05-11 | End: 2018-02-05 | Stop reason: SDUPTHER

## 2017-05-11 ASSESSMENT — PATIENT HEALTH QUESTIONNAIRE - PHQ9
SUM OF ALL RESPONSES TO PHQ QUESTIONS 1-9: 1
1. LITTLE INTEREST OR PLEASURE IN DOING THINGS: 0
2. FEELING DOWN, DEPRESSED OR HOPELESS: 1
SUM OF ALL RESPONSES TO PHQ9 QUESTIONS 1 & 2: 1

## 2017-05-12 DIAGNOSIS — Z11.59 NEED FOR HEPATITIS C SCREENING TEST: ICD-10-CM

## 2017-05-12 DIAGNOSIS — E78.5 HYPERLIPIDEMIA, UNSPECIFIED HYPERLIPIDEMIA TYPE: ICD-10-CM

## 2017-05-12 LAB
ALBUMIN SERPL-MCNC: 4.3 G/DL (ref 3.9–4.9)
ALP BLD-CCNC: 47 U/L (ref 35–104)
ALT SERPL-CCNC: 15 U/L (ref 0–41)
AST SERPL-CCNC: 16 U/L (ref 0–40)
BILIRUB SERPL-MCNC: 0.4 MG/DL (ref 0–1.2)
BILIRUBIN DIRECT: 0 MG/DL (ref 0–0.3)
BILIRUBIN, INDIRECT: 0.4 MG/DL (ref 0–0.6)
CHOLESTEROL, TOTAL: 172 MG/DL (ref 0–199)
HDLC SERPL-MCNC: 53 MG/DL (ref 40–59)
HEPATITIS C ANTIBODY INTERPRETATION: NORMAL
LDL CHOLESTEROL CALCULATED: 96 MG/DL (ref 0–129)
TOTAL PROTEIN: 6.7 G/DL (ref 6.4–8.1)
TRIGL SERPL-MCNC: 117 MG/DL (ref 0–200)

## 2017-05-23 ENCOUNTER — PROCEDURE VISIT (OUTPATIENT)
Dept: PHYSICAL MEDICINE AND REHAB | Age: 72
End: 2017-05-23

## 2017-05-23 DIAGNOSIS — M79.10 MYALGIA: Primary | ICD-10-CM

## 2017-05-23 PROCEDURE — 20553 NJX 1/MLT TRIGGER POINTS 3/>: CPT | Performed by: PHYSICAL MEDICINE & REHABILITATION

## 2017-06-19 ENCOUNTER — OFFICE VISIT (OUTPATIENT)
Dept: FAMILY MEDICINE CLINIC | Age: 72
End: 2017-06-19

## 2017-06-19 VITALS
SYSTOLIC BLOOD PRESSURE: 118 MMHG | BODY MASS INDEX: 28.64 KG/M2 | DIASTOLIC BLOOD PRESSURE: 62 MMHG | HEIGHT: 68 IN | OXYGEN SATURATION: 98 % | HEART RATE: 61 BPM | WEIGHT: 189 LBS

## 2017-06-19 DIAGNOSIS — D23.4 BENIGN NEOPLASM OF SKIN OF NECK: ICD-10-CM

## 2017-06-19 DIAGNOSIS — L57.0 ACTINIC KERATOSES: Primary | ICD-10-CM

## 2017-06-19 PROCEDURE — 11307 SHAVE SKIN LESION 1.1-2.0 CM: CPT | Performed by: FAMILY MEDICINE

## 2017-06-19 PROCEDURE — 17110 DESTRUCTION B9 LES UP TO 14: CPT | Performed by: FAMILY MEDICINE

## 2017-06-19 ASSESSMENT — ENCOUNTER SYMPTOMS
SHORTNESS OF BREATH: 0
ABDOMINAL PAIN: 0

## 2017-06-21 DIAGNOSIS — M54.40 CHRONIC BILATERAL LOW BACK PAIN WITH SCIATICA, SCIATICA LATERALITY UNSPECIFIED: ICD-10-CM

## 2017-06-21 DIAGNOSIS — M79.641 PAIN IN BOTH HANDS: ICD-10-CM

## 2017-06-21 DIAGNOSIS — M51.36 DDD (DEGENERATIVE DISC DISEASE), LUMBAR: ICD-10-CM

## 2017-06-21 DIAGNOSIS — G89.29 CHRONIC BILATERAL LOW BACK PAIN WITH SCIATICA, SCIATICA LATERALITY UNSPECIFIED: ICD-10-CM

## 2017-06-21 DIAGNOSIS — Z79.899 HIGH RISK MEDICATION USE: Chronic | ICD-10-CM

## 2017-06-21 DIAGNOSIS — M79.642 PAIN IN BOTH HANDS: ICD-10-CM

## 2017-06-21 DIAGNOSIS — M54.12 CERVICAL RADICULOPATHY AT C8: ICD-10-CM

## 2017-06-21 DIAGNOSIS — Z79.899 HIGH RISK MEDICATION USE: ICD-10-CM

## 2017-06-21 DIAGNOSIS — M50.20 DISPLACEMENT OF CERVICAL INTERVERTEBRAL DISC WITHOUT MYELOPATHY: ICD-10-CM

## 2017-06-21 DIAGNOSIS — M79.10 MYALGIA: ICD-10-CM

## 2017-06-21 RX ORDER — IBUPROFEN 800 MG/1
TABLET ORAL
Qty: 90 TABLET | Refills: 0 | Status: SHIPPED | OUTPATIENT
Start: 2017-06-21 | End: 2017-07-20 | Stop reason: SDUPTHER

## 2017-06-21 RX ORDER — OXYCODONE AND ACETAMINOPHEN 10; 325 MG/1; MG/1
TABLET ORAL
Qty: 90 TABLET | Refills: 0 | Status: SHIPPED | OUTPATIENT
Start: 2017-06-21 | End: 2017-07-13 | Stop reason: DRUGHIGH

## 2017-06-26 ENCOUNTER — PROCEDURE VISIT (OUTPATIENT)
Dept: PHYSICAL MEDICINE AND REHAB | Age: 72
End: 2017-06-26

## 2017-06-26 DIAGNOSIS — M79.10 MYALGIA: Primary | ICD-10-CM

## 2017-06-26 PROCEDURE — 20553 NJX 1/MLT TRIGGER POINTS 3/>: CPT | Performed by: PHYSICAL MEDICINE & REHABILITATION

## 2017-07-13 ENCOUNTER — OFFICE VISIT (OUTPATIENT)
Dept: PHYSICAL MEDICINE AND REHAB | Age: 72
End: 2017-07-13

## 2017-07-13 VITALS
SYSTOLIC BLOOD PRESSURE: 128 MMHG | BODY MASS INDEX: 28.04 KG/M2 | DIASTOLIC BLOOD PRESSURE: 70 MMHG | HEIGHT: 68 IN | WEIGHT: 185 LBS

## 2017-07-13 DIAGNOSIS — M51.36 DDD (DEGENERATIVE DISC DISEASE), LUMBAR: ICD-10-CM

## 2017-07-13 DIAGNOSIS — G25.81 RLS (RESTLESS LEGS SYNDROME): ICD-10-CM

## 2017-07-13 DIAGNOSIS — M54.12 CERVICAL RADICULOPATHY AT C8: ICD-10-CM

## 2017-07-13 DIAGNOSIS — M50.20 DISPLACEMENT OF CERVICAL INTERVERTEBRAL DISC WITHOUT MYELOPATHY: ICD-10-CM

## 2017-07-13 DIAGNOSIS — M19.09: ICD-10-CM

## 2017-07-13 DIAGNOSIS — Z79.899 HIGH RISK MEDICATION USE: ICD-10-CM

## 2017-07-13 DIAGNOSIS — Z79.899 HIGH RISK MEDICATION USE: Chronic | ICD-10-CM

## 2017-07-13 DIAGNOSIS — F32.A DEPRESSION, ACUTE: ICD-10-CM

## 2017-07-13 DIAGNOSIS — M70.60 GREATER TROCHANTERIC BURSITIS, UNSPECIFIED LATERALITY: Primary | Chronic | ICD-10-CM

## 2017-07-13 DIAGNOSIS — M47.812 CERVICAL SPONDYLOSIS WITHOUT MYELOPATHY: ICD-10-CM

## 2017-07-13 DIAGNOSIS — M79.10 MYALGIA: ICD-10-CM

## 2017-07-13 DIAGNOSIS — M47.817 LUMBOSACRAL SPONDYLOSIS WITHOUT MYELOPATHY: ICD-10-CM

## 2017-07-13 LAB
AMPHETAMINE SCREEN, URINE: NORMAL
BARBITURATE SCREEN URINE: NORMAL
BENZODIAZEPINE SCREEN, URINE: NORMAL
CANNABINOID SCREEN URINE: NORMAL
COCAINE METABOLITE SCREEN URINE: NORMAL
Lab: NORMAL
OPIATE SCREEN URINE: NORMAL
PHENCYCLIDINE SCREEN URINE: NORMAL

## 2017-07-13 PROCEDURE — 99215 OFFICE O/P EST HI 40 MIN: CPT | Performed by: PHYSICAL MEDICINE & REHABILITATION

## 2017-07-13 RX ORDER — ROPINIROLE 0.25 MG/1
0.25 TABLET, FILM COATED ORAL NIGHTLY
Qty: 90 TABLET | Refills: 3 | Status: SHIPPED | OUTPATIENT
Start: 2017-07-13 | End: 2018-07-18 | Stop reason: ALTCHOICE

## 2017-07-13 RX ORDER — OXYCODONE HYDROCHLORIDE AND ACETAMINOPHEN 5; 325 MG/1; MG/1
TABLET ORAL
Qty: 60 TABLET | Refills: 0 | Status: SHIPPED | OUTPATIENT
Start: 2017-07-13 | End: 2017-07-20

## 2017-07-13 RX ORDER — OXYCODONE AND ACETAMINOPHEN 10; 325 MG/1; MG/1
TABLET ORAL
Qty: 90 TABLET | Refills: 0 | Status: CANCELLED | OUTPATIENT
Start: 2017-07-13

## 2017-07-13 RX ORDER — ESCITALOPRAM OXALATE 5 MG/1
5 TABLET ORAL DAILY
Qty: 30 TABLET | Refills: 1 | Status: SHIPPED | OUTPATIENT
Start: 2017-07-13 | End: 2017-09-21

## 2017-07-13 ASSESSMENT — ENCOUNTER SYMPTOMS
WHEEZING: 0
NAUSEA: 0
ABDOMINAL PAIN: 0
ALLERGIC/IMMUNOLOGIC NEGATIVE: 1
SHORTNESS OF BREATH: 0
BACK PAIN: 1
DIARRHEA: 0
CONSTIPATION: 0
EYES NEGATIVE: 1
CHEST TIGHTNESS: 0
VOMITING: 0

## 2017-07-18 ENCOUNTER — OFFICE VISIT (OUTPATIENT)
Dept: FAMILY MEDICINE CLINIC | Age: 72
End: 2017-07-18

## 2017-07-18 VITALS
DIASTOLIC BLOOD PRESSURE: 84 MMHG | HEIGHT: 68 IN | SYSTOLIC BLOOD PRESSURE: 130 MMHG | OXYGEN SATURATION: 96 % | BODY MASS INDEX: 28.89 KG/M2 | WEIGHT: 190.6 LBS | HEART RATE: 80 BPM

## 2017-07-18 DIAGNOSIS — L57.0 BENIGN KERATOSIS: ICD-10-CM

## 2017-07-18 DIAGNOSIS — L57.0 ACTINIC KERATOSES: Primary | ICD-10-CM

## 2017-07-18 DIAGNOSIS — B00.1 HERPES LABIALIS: ICD-10-CM

## 2017-07-18 PROCEDURE — 17000 DESTRUCT PREMALG LESION: CPT | Performed by: FAMILY MEDICINE

## 2017-07-18 PROCEDURE — 99212 OFFICE O/P EST SF 10 MIN: CPT | Performed by: FAMILY MEDICINE

## 2017-07-18 RX ORDER — ACYCLOVIR 50 MG/G
OINTMENT TOPICAL
Qty: 30 G | Refills: 0 | Status: SHIPPED | OUTPATIENT
Start: 2017-07-18 | End: 2017-07-25

## 2017-07-18 RX ORDER — VALACYCLOVIR HYDROCHLORIDE 1 G/1
2000 TABLET, FILM COATED ORAL 2 TIMES DAILY
Qty: 4 TABLET | Refills: 0 | Status: SHIPPED | OUTPATIENT
Start: 2017-07-18 | End: 2018-07-18 | Stop reason: ALTCHOICE

## 2017-07-18 ASSESSMENT — ENCOUNTER SYMPTOMS
ABDOMINAL PAIN: 0
SHORTNESS OF BREATH: 0

## 2017-07-20 DIAGNOSIS — M79.641 PAIN IN BOTH HANDS: ICD-10-CM

## 2017-07-20 DIAGNOSIS — M79.642 PAIN IN BOTH HANDS: ICD-10-CM

## 2017-07-20 DIAGNOSIS — M54.40 CHRONIC BILATERAL LOW BACK PAIN WITH SCIATICA, SCIATICA LATERALITY UNSPECIFIED: ICD-10-CM

## 2017-07-20 DIAGNOSIS — G89.29 CHRONIC BILATERAL LOW BACK PAIN WITH SCIATICA, SCIATICA LATERALITY UNSPECIFIED: ICD-10-CM

## 2017-07-20 DIAGNOSIS — M79.10 MYALGIA: ICD-10-CM

## 2017-07-21 RX ORDER — IBUPROFEN 800 MG/1
TABLET ORAL
Qty: 90 TABLET | Refills: 0 | Status: SHIPPED | OUTPATIENT
Start: 2017-07-21 | End: 2018-07-18 | Stop reason: ALTCHOICE

## 2017-07-31 ENCOUNTER — PROCEDURE VISIT (OUTPATIENT)
Dept: PHYSICAL MEDICINE AND REHAB | Age: 72
End: 2017-07-31

## 2017-07-31 DIAGNOSIS — M79.10 MYALGIA: ICD-10-CM

## 2017-07-31 DIAGNOSIS — M79.7 FIBROMYALGIA: Primary | ICD-10-CM

## 2017-07-31 PROCEDURE — 20553 NJX 1/MLT TRIGGER POINTS 3/>: CPT | Performed by: PHYSICAL MEDICINE & REHABILITATION

## 2017-08-09 ENCOUNTER — TELEPHONE (OUTPATIENT)
Dept: PHYSICAL MEDICINE AND REHAB | Age: 72
End: 2017-08-09

## 2017-08-10 ENCOUNTER — TELEPHONE (OUTPATIENT)
Dept: FAMILY MEDICINE CLINIC | Age: 72
End: 2017-08-10

## 2017-08-11 ENCOUNTER — TELEPHONE (OUTPATIENT)
Dept: PHYSICAL MEDICINE AND REHAB | Age: 72
End: 2017-08-11

## 2017-08-11 DIAGNOSIS — M47.817 LUMBOSACRAL SPONDYLOSIS WITHOUT MYELOPATHY: ICD-10-CM

## 2017-08-11 DIAGNOSIS — Z79.899 HIGH RISK MEDICATION USE: Chronic | ICD-10-CM

## 2017-08-11 DIAGNOSIS — M47.812 CERVICAL SPONDYLOSIS WITHOUT MYELOPATHY: ICD-10-CM

## 2017-08-11 DIAGNOSIS — M51.36 DDD (DEGENERATIVE DISC DISEASE), LUMBAR: ICD-10-CM

## 2017-08-11 DIAGNOSIS — Z79.899 HIGH RISK MEDICATION USE: ICD-10-CM

## 2017-08-11 DIAGNOSIS — M50.20 DISPLACEMENT OF CERVICAL INTERVERTEBRAL DISC WITHOUT MYELOPATHY: ICD-10-CM

## 2017-08-11 DIAGNOSIS — M54.12 CERVICAL RADICULOPATHY AT C8: ICD-10-CM

## 2017-08-11 RX ORDER — OXYCODONE HYDROCHLORIDE AND ACETAMINOPHEN 5; 325 MG/1; MG/1
TABLET ORAL
Qty: 60 TABLET | Refills: 0 | Status: CANCELLED | OUTPATIENT
Start: 2017-08-11 | End: 2017-08-18

## 2017-08-18 ENCOUNTER — TELEPHONE (OUTPATIENT)
Dept: FAMILY MEDICINE CLINIC | Age: 72
End: 2017-08-18

## 2017-08-18 RX ORDER — IRBESARTAN 300 MG/1
300 TABLET ORAL NIGHTLY
Qty: 90 TABLET | Refills: 3 | Status: SHIPPED | OUTPATIENT
Start: 2017-08-18 | End: 2018-07-18 | Stop reason: ALTCHOICE

## 2017-08-25 ENCOUNTER — OFFICE VISIT (OUTPATIENT)
Dept: FAMILY MEDICINE CLINIC | Age: 72
End: 2017-08-25

## 2017-08-25 VITALS
BODY MASS INDEX: 29.83 KG/M2 | HEIGHT: 68 IN | WEIGHT: 196.8 LBS | SYSTOLIC BLOOD PRESSURE: 152 MMHG | HEART RATE: 83 BPM | OXYGEN SATURATION: 97 % | TEMPERATURE: 98.7 F | DIASTOLIC BLOOD PRESSURE: 98 MMHG

## 2017-08-25 DIAGNOSIS — G89.4 CHRONIC PAIN SYNDROME: ICD-10-CM

## 2017-08-25 DIAGNOSIS — R53.83 FATIGUE, UNSPECIFIED TYPE: ICD-10-CM

## 2017-08-25 DIAGNOSIS — F32.A DEPRESSION, UNSPECIFIED DEPRESSION TYPE: ICD-10-CM

## 2017-08-25 DIAGNOSIS — Z23 NEED FOR INFLUENZA VACCINATION: ICD-10-CM

## 2017-08-25 DIAGNOSIS — Z12.5 SCREENING PSA (PROSTATE SPECIFIC ANTIGEN): ICD-10-CM

## 2017-08-25 DIAGNOSIS — R45.1 AGITATION: Primary | ICD-10-CM

## 2017-08-25 DIAGNOSIS — D64.9 ANEMIA, UNSPECIFIED TYPE: Primary | ICD-10-CM

## 2017-08-25 LAB
HCT VFR BLD CALC: 38.2 % (ref 42–52)
HEMOGLOBIN: 12.9 G/DL (ref 14–18)
MCH RBC QN AUTO: 30.9 PG (ref 27–31.3)
MCHC RBC AUTO-ENTMCNC: 33.8 % (ref 33–37)
MCV RBC AUTO: 91.3 FL (ref 80–100)
PDW BLD-RTO: 13.7 % (ref 11.5–14.5)
PLATELET # BLD: 169 K/UL (ref 130–400)
PROSTATE SPECIFIC ANTIGEN: 0.67 NG/ML (ref 0–6.22)
RBC # BLD: 4.18 M/UL (ref 4.7–6.1)
TSH SERPL DL<=0.05 MIU/L-ACNC: 1.24 UIU/ML (ref 0.27–4.2)
WBC # BLD: 8.2 K/UL (ref 4.8–10.8)

## 2017-08-25 PROCEDURE — G0008 ADMIN INFLUENZA VIRUS VAC: HCPCS | Performed by: FAMILY MEDICINE

## 2017-08-25 PROCEDURE — 99213 OFFICE O/P EST LOW 20 MIN: CPT | Performed by: FAMILY MEDICINE

## 2017-08-25 PROCEDURE — 90662 IIV NO PRSV INCREASED AG IM: CPT | Performed by: FAMILY MEDICINE

## 2017-08-25 RX ORDER — DULOXETIN HYDROCHLORIDE 30 MG/1
30 CAPSULE, DELAYED RELEASE ORAL EVERY EVENING
Qty: 30 CAPSULE | Refills: 3 | Status: SHIPPED | OUTPATIENT
Start: 2017-08-25 | End: 2017-09-21

## 2017-08-28 LAB
FOLATE: >20 NG/ML (ref 7.3–26.1)
IRON SATURATION: 19 % (ref 11–46)
IRON: 72 UG/DL (ref 59–158)
TESTOSTERONE TOTAL-MALE: 494 NG/DL (ref 300–720)
TOTAL IRON BINDING CAPACITY: 381 UG/DL (ref 178–450)
VITAMIN B-12: 1657 PG/ML (ref 211–946)

## 2017-09-14 DIAGNOSIS — K21.9 GASTROESOPHAGEAL REFLUX DISEASE WITHOUT ESOPHAGITIS: ICD-10-CM

## 2017-09-14 RX ORDER — OMEPRAZOLE 40 MG/1
CAPSULE, DELAYED RELEASE ORAL
Qty: 90 CAPSULE | Refills: 3 | Status: SHIPPED | OUTPATIENT
Start: 2017-09-14 | End: 2018-09-10 | Stop reason: SDUPTHER

## 2017-09-21 ENCOUNTER — OFFICE VISIT (OUTPATIENT)
Dept: FAMILY MEDICINE CLINIC | Age: 72
End: 2017-09-21

## 2017-09-21 VITALS
SYSTOLIC BLOOD PRESSURE: 160 MMHG | OXYGEN SATURATION: 98 % | WEIGHT: 198.4 LBS | BODY MASS INDEX: 30.07 KG/M2 | DIASTOLIC BLOOD PRESSURE: 90 MMHG | HEART RATE: 100 BPM | HEIGHT: 68 IN

## 2017-09-21 DIAGNOSIS — D64.9 ANEMIA, UNSPECIFIED TYPE: Primary | ICD-10-CM

## 2017-09-21 DIAGNOSIS — R45.1 AGITATION: ICD-10-CM

## 2017-09-21 DIAGNOSIS — R51.9 NONINTRACTABLE HEADACHE, UNSPECIFIED CHRONICITY PATTERN, UNSPECIFIED HEADACHE TYPE: ICD-10-CM

## 2017-09-21 DIAGNOSIS — I10 ESSENTIAL HYPERTENSION: ICD-10-CM

## 2017-09-21 DIAGNOSIS — F41.9 ANXIETY: ICD-10-CM

## 2017-09-21 PROCEDURE — 99213 OFFICE O/P EST LOW 20 MIN: CPT | Performed by: FAMILY MEDICINE

## 2017-09-21 RX ORDER — DIAZEPAM 5 MG/1
5 TABLET ORAL EVERY 12 HOURS PRN
Qty: 10 TABLET | Refills: 0 | Status: SHIPPED | OUTPATIENT
Start: 2017-09-21 | End: 2017-09-28

## 2017-09-21 RX ORDER — BUSPIRONE HYDROCHLORIDE 5 MG/1
5 TABLET ORAL 2 TIMES DAILY PRN
Qty: 60 TABLET | Refills: 1 | Status: SHIPPED | OUTPATIENT
Start: 2017-09-21 | End: 2019-01-04

## 2017-10-18 ENCOUNTER — TELEPHONE (OUTPATIENT)
Dept: FAMILY MEDICINE CLINIC | Age: 72
End: 2017-10-18

## 2017-10-18 DIAGNOSIS — M54.31 RIGHT SCIATIC NERVE PAIN: ICD-10-CM

## 2017-10-18 DIAGNOSIS — M51.36 DDD (DEGENERATIVE DISC DISEASE), LUMBAR: Primary | ICD-10-CM

## 2017-10-18 DIAGNOSIS — G56.22 ULNAR NEUROPATHY OF LEFT UPPER EXTREMITY: ICD-10-CM

## 2017-10-18 DIAGNOSIS — M54.12 C7 RADICULOPATHY: ICD-10-CM

## 2017-10-18 DIAGNOSIS — M77.11 LATERAL EPICONDYLITIS OF RIGHT ELBOW: ICD-10-CM

## 2017-10-23 ENCOUNTER — OFFICE VISIT (OUTPATIENT)
Dept: FAMILY MEDICINE CLINIC | Age: 72
End: 2017-10-23

## 2017-10-23 VITALS
HEIGHT: 68 IN | SYSTOLIC BLOOD PRESSURE: 126 MMHG | BODY MASS INDEX: 31.37 KG/M2 | OXYGEN SATURATION: 98 % | WEIGHT: 207 LBS | DIASTOLIC BLOOD PRESSURE: 74 MMHG | HEART RATE: 64 BPM

## 2017-10-23 DIAGNOSIS — D64.9 ANEMIA, UNSPECIFIED TYPE: ICD-10-CM

## 2017-10-23 DIAGNOSIS — M47.817 LUMBOSACRAL SPONDYLOSIS WITHOUT MYELOPATHY: ICD-10-CM

## 2017-10-23 DIAGNOSIS — I10 HYPERTENSION, UNSPECIFIED TYPE: ICD-10-CM

## 2017-10-23 DIAGNOSIS — D64.9 ANEMIA, UNSPECIFIED TYPE: Primary | ICD-10-CM

## 2017-10-23 DIAGNOSIS — M47.812 CERVICAL SPONDYLOSIS WITHOUT MYELOPATHY: ICD-10-CM

## 2017-10-23 LAB
HCT VFR BLD CALC: 39.6 % (ref 42–52)
HEMOGLOBIN: 13.2 G/DL (ref 14–18)
MCH RBC QN AUTO: 30.4 PG (ref 27–31.3)
MCHC RBC AUTO-ENTMCNC: 33.4 % (ref 33–37)
MCV RBC AUTO: 91.2 FL (ref 80–100)
PDW BLD-RTO: 13.8 % (ref 11.5–14.5)
PLATELET # BLD: 218 K/UL (ref 130–400)
RBC # BLD: 4.34 M/UL (ref 4.7–6.1)
WBC # BLD: 6.9 K/UL (ref 4.8–10.8)

## 2017-10-23 PROCEDURE — 99213 OFFICE O/P EST LOW 20 MIN: CPT | Performed by: FAMILY MEDICINE

## 2017-10-23 NOTE — PROGRESS NOTES
Chief Complaint   Patient presents with    Hypertension     bp check, discuss pain management     Ellis Simmons is a 81 y.o. male    As per CC  Agitated/anxious  Couldn't take cymbalta due to side effects    Noted bp up    Labs showed anemia with normal iron and b12/folate      Patient Active Problem List   Diagnosis    GERD (gastroesophageal reflux disease)    Hyperlipidemia    Hypertension    Chronic nasal congestion    Prostatitis    Low back pain    Hand pain    High risk medication use - OARRS PM&R 5/9/17, 07/12/17 OARRS PM&R, 01/24/17 Med Contract PM&R, 07/13/17 Urine Drug Screen: negative PM&R    DDD (degenerative disc disease), lumbar    Myalgia    Sciatic pain    Greater trochanteric bursitis    C7 radiculopathy    Bilateral carpal tunnel syndrome    Ulnar neuropathy of left upper extremity    Lateral epicondylitis (tennis elbow)    Cervical radiculopathy at C8    Displacement of cervical intervertebral disc without myelopathy    Displacement of lumbar intervertebral disc without myelopathy    Primary localized osteoarthrosis, other specified sites    Patient overweight    Lumbosacral spondylosis without myelopathy    Cervical spondylosis without myelopathy    Excess weight    Anxiety    Tardy ulnar nerve palsy    Actinic keratoses    RLS (restless legs syndrome)    Herpes labialis       Current Outpatient Prescriptions   Medication Sig Dispense Refill    metoprolol tartrate (LOPRESSOR) 25 MG tablet Take 1 tablet by mouth 2 times daily 60 tablet 3    busPIRone (BUSPAR) 5 MG tablet Take 1 tablet by mouth 2 times daily as needed (anxiety) 60 tablet 1    omeprazole (PRILOSEC) 40 MG delayed release capsule TAKE 1 CAPSULE DAILY 90 capsule 3    irbesartan (AVAPRO) 300 MG tablet Take 1 tablet by mouth nightly 90 tablet 3    ibuprofen (ADVIL;MOTRIN) 800 MG tablet TAKE 1 TABLET BY MOUTH EVERY 8 HOURS AS NEEDED FOR PAIN 90 tablet 0    valACYclovir (VALTREX) 1 g tablet Take 2

## 2018-02-05 DIAGNOSIS — E78.5 HYPERLIPIDEMIA, UNSPECIFIED HYPERLIPIDEMIA TYPE: ICD-10-CM

## 2018-02-05 RX ORDER — ATORVASTATIN CALCIUM 20 MG/1
TABLET, FILM COATED ORAL
Qty: 90 TABLET | Refills: 2 | Status: SHIPPED | OUTPATIENT
Start: 2018-02-05 | End: 2018-11-04 | Stop reason: SDUPTHER

## 2018-03-19 ENCOUNTER — TELEPHONE (OUTPATIENT)
Dept: FAMILY MEDICINE CLINIC | Age: 73
End: 2018-03-19

## 2018-03-19 DIAGNOSIS — M54.40 LOW BACK PAIN WITH SCIATICA, SCIATICA LATERALITY UNSPECIFIED, UNSPECIFIED BACK PAIN LATERALITY, UNSPECIFIED CHRONICITY: Primary | ICD-10-CM

## 2018-07-14 ENCOUNTER — HOSPITAL ENCOUNTER (EMERGENCY)
Age: 73
Discharge: HOME | End: 2018-07-14
Payer: MEDICARE

## 2018-07-14 ENCOUNTER — APPOINTMENT (OUTPATIENT)
Dept: CT IMAGING | Age: 73
End: 2018-07-14
Payer: MEDICARE

## 2018-07-14 ENCOUNTER — APPOINTMENT (OUTPATIENT)
Dept: GENERAL RADIOLOGY | Age: 73
End: 2018-07-14
Payer: MEDICARE

## 2018-07-14 VITALS
WEIGHT: 200 LBS | BODY MASS INDEX: 30.41 KG/M2 | HEART RATE: 75 BPM | TEMPERATURE: 98.3 F | OXYGEN SATURATION: 99 % | RESPIRATION RATE: 16 BRPM | DIASTOLIC BLOOD PRESSURE: 66 MMHG | SYSTOLIC BLOOD PRESSURE: 108 MMHG

## 2018-07-14 DIAGNOSIS — R91.1 LUNG NODULE: ICD-10-CM

## 2018-07-14 DIAGNOSIS — B34.9 VIRAL SYNDROME: Primary | ICD-10-CM

## 2018-07-14 LAB
ALBUMIN SERPL-MCNC: 3.7 G/DL (ref 3.9–4.9)
ALP BLD-CCNC: 48 U/L (ref 35–104)
ALT SERPL-CCNC: 22 U/L (ref 0–41)
ANION GAP SERPL CALCULATED.3IONS-SCNC: 12 MEQ/L (ref 7–13)
AST SERPL-CCNC: 19 U/L (ref 0–40)
BASOPHILS ABSOLUTE: 0 K/UL (ref 0–0.2)
BASOPHILS RELATIVE PERCENT: 0.2 %
BILIRUB SERPL-MCNC: 0.5 MG/DL (ref 0–1.2)
BILIRUBIN URINE: NEGATIVE
BLOOD, URINE: NEGATIVE
BUN BLDV-MCNC: 23 MG/DL (ref 8–23)
CALCIUM SERPL-MCNC: 8.1 MG/DL (ref 8.6–10.2)
CHLORIDE BLD-SCNC: 99 MEQ/L (ref 98–107)
CK MB: 2.3 NG/ML (ref 0–6.7)
CLARITY: CLEAR
CO2: 21 MEQ/L (ref 22–29)
COLOR: YELLOW
CREAT SERPL-MCNC: 1.19 MG/DL (ref 0.7–1.2)
CREATINE KINASE-MB INDEX: 1.2 % (ref 0–3.5)
EKG ATRIAL RATE: 69 BPM
EKG P AXIS: 15 DEGREES
EKG P-R INTERVAL: 158 MS
EKG Q-T INTERVAL: 450 MS
EKG QRS DURATION: 148 MS
EKG QTC CALCULATION (BAZETT): 482 MS
EKG R AXIS: -25 DEGREES
EKG T AXIS: -12 DEGREES
EKG VENTRICULAR RATE: 69 BPM
EOSINOPHILS ABSOLUTE: 0 K/UL (ref 0–0.7)
EOSINOPHILS RELATIVE PERCENT: 0.8 %
GFR AFRICAN AMERICAN: >60
GFR NON-AFRICAN AMERICAN: >60
GLOBULIN: 2.8 G/DL (ref 2.3–3.5)
GLUCOSE BLD-MCNC: 131 MG/DL (ref 74–109)
GLUCOSE URINE: NEGATIVE MG/DL
HCT VFR BLD CALC: 33.8 % (ref 42–52)
HEMOGLOBIN: 12 G/DL (ref 14–18)
INR BLD: 1.1
KETONES, URINE: NEGATIVE MG/DL
LACTIC ACID, SEPSIS: 0.8 MMOL/L (ref 0.5–1.9)
LEUKOCYTE ESTERASE, URINE: NEGATIVE
LYMPHOCYTES ABSOLUTE: 0.6 K/UL (ref 1–4.8)
LYMPHOCYTES RELATIVE PERCENT: 9.1 %
MAGNESIUM: 2 MG/DL (ref 1.7–2.3)
MCH RBC QN AUTO: 31.7 PG (ref 27–31.3)
MCHC RBC AUTO-ENTMCNC: 35.4 % (ref 33–37)
MCV RBC AUTO: 89.4 FL (ref 80–100)
MONOCYTES ABSOLUTE: 0.6 K/UL (ref 0.2–0.8)
MONOCYTES RELATIVE PERCENT: 9.7 %
NEUTROPHILS ABSOLUTE: 5.1 K/UL (ref 1.4–6.5)
NEUTROPHILS RELATIVE PERCENT: 80.2 %
NITRITE, URINE: NEGATIVE
PDW BLD-RTO: 13.5 % (ref 11.5–14.5)
PH UA: 5.5 (ref 5–9)
PLATELET # BLD: 178 K/UL (ref 130–400)
POC CREATININE WHOLE BLOOD: 1.19
POTASSIUM SERPL-SCNC: 3.4 MEQ/L (ref 3.5–5.1)
PRO-BNP: 739 PG/ML
PROTEIN UA: NEGATIVE MG/DL
PROTHROMBIN TIME: 11.3 SEC (ref 9.6–12.3)
RBC # BLD: 3.78 M/UL (ref 4.7–6.1)
SODIUM BLD-SCNC: 132 MEQ/L (ref 132–144)
SPECIFIC GRAVITY UA: 1.01 (ref 1–1.03)
TOTAL CK: 193 U/L (ref 0–190)
TOTAL PROTEIN: 6.5 G/DL (ref 6.4–8.1)
TROPONIN: <0.01 NG/ML (ref 0–0.01)
TSH SERPL DL<=0.05 MIU/L-ACNC: 0.28 UIU/ML (ref 0.27–4.2)
URINE REFLEX TO CULTURE: NORMAL
UROBILINOGEN, URINE: 0.2 E.U./DL
WBC # BLD: 6.4 K/UL (ref 4.8–10.8)

## 2018-07-14 PROCEDURE — 80053 COMPREHEN METABOLIC PANEL: CPT

## 2018-07-14 PROCEDURE — 93005 ELECTROCARDIOGRAM TRACING: CPT

## 2018-07-14 PROCEDURE — 81003 URINALYSIS AUTO W/O SCOPE: CPT

## 2018-07-14 PROCEDURE — 83880 ASSAY OF NATRIURETIC PEPTIDE: CPT

## 2018-07-14 PROCEDURE — 84443 ASSAY THYROID STIM HORMONE: CPT

## 2018-07-14 PROCEDURE — 96375 TX/PRO/DX INJ NEW DRUG ADDON: CPT

## 2018-07-14 PROCEDURE — 83735 ASSAY OF MAGNESIUM: CPT

## 2018-07-14 PROCEDURE — 2580000003 HC RX 258: Performed by: PERSONAL EMERGENCY RESPONSE ATTENDANT

## 2018-07-14 PROCEDURE — 85610 PROTHROMBIN TIME: CPT

## 2018-07-14 PROCEDURE — 83605 ASSAY OF LACTIC ACID: CPT

## 2018-07-14 PROCEDURE — 82550 ASSAY OF CK (CPK): CPT

## 2018-07-14 PROCEDURE — 74177 CT ABD & PELVIS W/CONTRAST: CPT

## 2018-07-14 PROCEDURE — 85025 COMPLETE CBC W/AUTO DIFF WBC: CPT

## 2018-07-14 PROCEDURE — 99284 EMERGENCY DEPT VISIT MOD MDM: CPT

## 2018-07-14 PROCEDURE — 87040 BLOOD CULTURE FOR BACTERIA: CPT

## 2018-07-14 PROCEDURE — 6360000002 HC RX W HCPCS: Performed by: PERSONAL EMERGENCY RESPONSE ATTENDANT

## 2018-07-14 PROCEDURE — 96374 THER/PROPH/DIAG INJ IV PUSH: CPT

## 2018-07-14 PROCEDURE — 82553 CREATINE MB FRACTION: CPT

## 2018-07-14 PROCEDURE — 71045 X-RAY EXAM CHEST 1 VIEW: CPT

## 2018-07-14 PROCEDURE — 36415 COLL VENOUS BLD VENIPUNCTURE: CPT

## 2018-07-14 PROCEDURE — 70450 CT HEAD/BRAIN W/O DYE: CPT

## 2018-07-14 PROCEDURE — 84484 ASSAY OF TROPONIN QUANT: CPT

## 2018-07-14 PROCEDURE — 6360000004 HC RX CONTRAST MEDICATION: Performed by: PERSONAL EMERGENCY RESPONSE ATTENDANT

## 2018-07-14 RX ORDER — 0.9 % SODIUM CHLORIDE 0.9 %
1000 INTRAVENOUS SOLUTION INTRAVENOUS ONCE
Status: COMPLETED | OUTPATIENT
Start: 2018-07-14 | End: 2018-07-14

## 2018-07-14 RX ORDER — FENTANYL CITRATE 50 UG/ML
50 INJECTION, SOLUTION INTRAMUSCULAR; INTRAVENOUS ONCE
Status: COMPLETED | OUTPATIENT
Start: 2018-07-14 | End: 2018-07-14

## 2018-07-14 RX ORDER — ONDANSETRON 2 MG/ML
4 INJECTION INTRAMUSCULAR; INTRAVENOUS ONCE
Status: COMPLETED | OUTPATIENT
Start: 2018-07-14 | End: 2018-07-14

## 2018-07-14 RX ORDER — SODIUM CHLORIDE 0.9 % (FLUSH) 0.9 %
10 SYRINGE (ML) INJECTION PRN
Status: DISCONTINUED | OUTPATIENT
Start: 2018-07-14 | End: 2018-07-14 | Stop reason: HOSPADM

## 2018-07-14 RX ADMIN — SODIUM CHLORIDE 1000 ML: 9 INJECTION, SOLUTION INTRAVENOUS at 19:04

## 2018-07-14 RX ADMIN — SODIUM CHLORIDE 1000 ML: 9 INJECTION, SOLUTION INTRAVENOUS at 18:41

## 2018-07-14 RX ADMIN — ONDANSETRON 4 MG: 2 INJECTION INTRAMUSCULAR; INTRAVENOUS at 19:03

## 2018-07-14 RX ADMIN — FENTANYL CITRATE 50 MCG: 50 INJECTION, SOLUTION INTRAMUSCULAR; INTRAVENOUS at 19:03

## 2018-07-14 RX ADMIN — IOPAMIDOL 100 ML: 755 INJECTION, SOLUTION INTRAVENOUS at 19:22

## 2018-07-14 ASSESSMENT — ENCOUNTER SYMPTOMS
SHORTNESS OF BREATH: 0
RHINORRHEA: 0
DIARRHEA: 1
BLOOD IN STOOL: 0
SORE THROAT: 0
COLOR CHANGE: 0
COUGH: 0
VOMITING: 0
NAUSEA: 1
ABDOMINAL PAIN: 1

## 2018-07-14 ASSESSMENT — PAIN DESCRIPTION - FREQUENCY: FREQUENCY: INTERMITTENT

## 2018-07-14 ASSESSMENT — PAIN DESCRIPTION - LOCATION: LOCATION: BACK;KNEE

## 2018-07-14 ASSESSMENT — PAIN SCALES - GENERAL
PAINLEVEL_OUTOF10: 7
PAINLEVEL_OUTOF10: 7

## 2018-07-14 ASSESSMENT — PAIN DESCRIPTION - ORIENTATION: ORIENTATION: RIGHT;LEFT

## 2018-07-14 ASSESSMENT — PAIN DESCRIPTION - DESCRIPTORS: DESCRIPTORS: DISCOMFORT

## 2018-07-17 LAB
ERYTHROCYTE [DISTWIDTH] IN BLOOD BY AUTOMATED COUNT: 12.9 % (ref 12–15.4)
ERYTHROCYTE [DISTWIDTH] IN BLOOD BY AUTOMATED COUNT: 42.5 FL (ref 39.3–48.6)
HCT VFR BLD CALC: 30.8 % (ref 38.4–54.9)
HEMOGLOBIN: 10.7 G/DL (ref 12.8–17.7)
INR BLD: 1.15 (ref 0.9–1.1)
MCH RBC QN AUTO: 30.9 PG (ref 27.5–32.9)
MCHC RBC AUTO-ENTMCNC: 34.7 G/DL (ref 30.5–35.4)
MCV RBC AUTO: 89 FL (ref 83.3–98.2)
NUCLEATED RBCS: 0 /100{WBCS}
PLATELET # BLD: 186 10*3/UL (ref 155–404)
PMV BLD AUTO: 10.3 FL (ref 9.9–12.1)
PROTHROMBIN TIME: 12.8 SEC (ref 9.8–12.7)
RBC: 3.46 10*6/UL (ref 4.08–6.37)
RBCS COUNTED: 0 10*3/UL
WBC: 6.7 10*3/UL (ref 4.2–11)

## 2018-07-18 ENCOUNTER — CARE COORDINATION (OUTPATIENT)
Dept: CASE MANAGEMENT | Age: 73
End: 2018-07-18

## 2018-07-18 DIAGNOSIS — J18.9 PNEUMONIA DUE TO INFECTIOUS ORGANISM, UNSPECIFIED LATERALITY, UNSPECIFIED PART OF LUNG: ICD-10-CM

## 2018-07-18 PROCEDURE — 1111F DSCHRG MED/CURRENT MED MERGE: CPT | Performed by: FAMILY MEDICINE

## 2018-07-18 RX ORDER — LISINOPRIL 2.5 MG/1
2.5 TABLET ORAL DAILY
COMMUNITY
End: 2019-03-18

## 2018-07-18 RX ORDER — AMOXICILLIN AND CLAVULANATE POTASSIUM 875; 125 MG/1; MG/1
1 TABLET, FILM COATED ORAL 2 TIMES DAILY
COMMUNITY
End: 2019-01-04 | Stop reason: ALTCHOICE

## 2018-07-18 RX ORDER — ASPIRIN 81 MG/1
81 TABLET, CHEWABLE ORAL DAILY
COMMUNITY
End: 2019-03-18

## 2018-07-18 RX ORDER — DOCOSANOL 100 MG/G
CREAM TOPICAL 2 TIMES DAILY
COMMUNITY
End: 2019-03-18

## 2018-07-19 LAB
BLOOD CULTURE, ROUTINE: NORMAL
CULTURE, BLOOD 2: NORMAL

## 2018-07-24 ENCOUNTER — OFFICE VISIT (OUTPATIENT)
Dept: FAMILY MEDICINE CLINIC | Age: 73
End: 2018-07-24
Payer: MEDICARE

## 2018-07-24 VITALS
HEIGHT: 68 IN | SYSTOLIC BLOOD PRESSURE: 132 MMHG | BODY MASS INDEX: 30.16 KG/M2 | WEIGHT: 199 LBS | OXYGEN SATURATION: 98 % | DIASTOLIC BLOOD PRESSURE: 88 MMHG | HEART RATE: 66 BPM

## 2018-07-24 DIAGNOSIS — D64.9 CHRONIC ANEMIA: ICD-10-CM

## 2018-07-24 DIAGNOSIS — J18.9 PNEUMONIA OF LEFT LOWER LOBE DUE TO INFECTIOUS ORGANISM: Primary | ICD-10-CM

## 2018-07-24 DIAGNOSIS — E78.5 HYPERLIPIDEMIA, UNSPECIFIED HYPERLIPIDEMIA TYPE: ICD-10-CM

## 2018-07-24 DIAGNOSIS — I10 HYPERTENSION, UNSPECIFIED TYPE: ICD-10-CM

## 2018-07-24 DIAGNOSIS — R77.8 TROPONIN LEVEL ELEVATED: ICD-10-CM

## 2018-07-24 PROCEDURE — 99495 TRANSJ CARE MGMT MOD F2F 14D: CPT | Performed by: FAMILY MEDICINE

## 2018-07-24 PROCEDURE — 1111F DSCHRG MED/CURRENT MED MERGE: CPT | Performed by: FAMILY MEDICINE

## 2018-07-24 PROCEDURE — 3288F FALL RISK ASSESSMENT DOCD: CPT | Performed by: FAMILY MEDICINE

## 2018-07-24 RX ORDER — ASCORBIC ACID 500 MG
TABLET ORAL
COMMUNITY

## 2018-07-24 RX ORDER — ASCORBIC ACID 500 MG
500 TABLET ORAL DAILY
COMMUNITY

## 2018-07-24 RX ORDER — MULTIVIT WITH MINERALS/LUTEIN
1000 TABLET ORAL DAILY
COMMUNITY

## 2018-07-24 RX ORDER — AMOXICILLIN 875 MG/1
875 TABLET, COATED ORAL 2 TIMES DAILY
COMMUNITY
End: 2018-07-24 | Stop reason: SINTOL

## 2018-07-24 NOTE — PROGRESS NOTES
Post-Discharge Transitional Care Management Services      Serena Wagoner   YOB: 1945    Date of Office Visit:  7/24/2018  Date of Hospital Admission: 7/14/18  Date of Hospital Discharge: 7/14/18  [facility] Risk Score [risk of hospital readmission >=10  medium risk (chance of readmission ~ 12%) >14  high risk (chance of readmission ~18%)]:Readmission Risk Score: 4    Care management risk score Rising risk (score 2-5) and Complex Care (Scores >=6): 1       Patient Active Problem List   Diagnosis    GERD (gastroesophageal reflux disease)    Hyperlipidemia    Hypertension    Chronic nasal congestion    Prostatitis    Low back pain    Hand pain    High risk medication use - OARRS PM&R 5/9/17, 07/12/17 OARRS PM&R, 01/24/17 Med Contract PM&R, 07/13/17 Urine Drug Screen: negative PM&R    DDD (degenerative disc disease), lumbar    Myalgia    Sciatic pain    Greater trochanteric bursitis    C7 radiculopathy    Bilateral carpal tunnel syndrome    Ulnar neuropathy of left upper extremity    Lateral epicondylitis (tennis elbow)    Cervical radiculopathy at C8    Displacement of cervical intervertebral disc without myelopathy    Displacement of lumbar intervertebral disc without myelopathy    Primary localized osteoarthrosis, other specified sites    Patient overweight    Lumbosacral spondylosis without myelopathy    Cervical spondylosis without myelopathy    Excess weight    Anxiety    Tardy ulnar nerve palsy    Actinic keratoses    RLS (restless legs syndrome)    Herpes labialis    Pneumonia       Allergies   Allergen Reactions    Neurontin [Gabapentin] Other (See Comments)     Funny feeling    Topamax [Topiramate] Other (See Comments)     Funny feeling       Medications listed as ordered at the time of discharge from [facility] Medication Instructions CRISTY:    Printed on:07/24/18 6468   Medication Information daily       aspirin (NACHO LOW DOSE) 81 MG chewable tablet Take 81 mg by mouth daily      atorvastatin (LIPITOR) 20 MG tablet TAKE 1 TABLET DAILY 90 tablet 2    busPIRone (BUSPAR) 5 MG tablet Take 1 tablet by mouth 2 times daily as needed (anxiety) 60 tablet 1    omeprazole (PRILOSEC) 40 MG delayed release capsule TAKE 1 CAPSULE DAILY 90 capsule 3    diclofenac sodium (VOLTAREN) 1 % GEL Apply 4 g topically 4 times daily Generic equivalent acceptable, unless otherwise noted. 5 Tube 5    KRILL OIL PO Take 750 mg by mouth daily       Coenzyme Q10 (COQ10 PO) Take 100 mg by mouth daily       calcium carbonate (OSCAL) 500 MG TABS tablet Take 500 mg by mouth daily. Medications patient taking as of now reconciled against medications ordered at time of hospital discharge: Yes    Vitals:    07/24/18 1501   BP: 132/88   Site: Right Arm   Pulse: 66   SpO2: 98%   Weight: 199 lb (90.3 kg)   Height: 5' 8\" (1.727 m)     Body mass index is 30.26 kg/m². Wt Readings from Last 3 Encounters:   07/24/18 199 lb (90.3 kg)   07/14/18 200 lb (90.7 kg)   10/26/17 190 lb (86.2 kg)     BP Readings from Last 3 Encounters:   07/24/18 132/88   07/14/18 108/66   10/23/17 126/74        Inpatient course: Discharge summary reviewed- see chart. Chief Complaint   Patient presents with    Follow-Up from [facility] ER, sent home and had 104.9 temp, went to [facility] 20 to tuesday, pneumonia      History of Present illness - Follow up of Hospital diagnosis(es):     LLL pneumonia      Non face to face  following discharge, date last encounter closed (first attempt may have been earlier): 7/18/2018  4:53 PM    Call initiated 2 business days of discharge: Yes     Interval history/Current status:    Initially went to [facility] ER with some stomach pain, fever and general feeling of being ill  Headache    Some question of incidental finding of lung lesion on CT?     Official read shows nothing      Went home  Still sick, returned to

## 2018-09-10 DIAGNOSIS — K21.9 GASTROESOPHAGEAL REFLUX DISEASE WITHOUT ESOPHAGITIS: ICD-10-CM

## 2018-09-10 RX ORDER — OMEPRAZOLE 40 MG/1
CAPSULE, DELAYED RELEASE ORAL
Qty: 90 CAPSULE | Refills: 3 | Status: SHIPPED | OUTPATIENT
Start: 2018-09-10 | End: 2019-09-08 | Stop reason: SDUPTHER

## 2018-10-23 RX ORDER — IRBESARTAN 300 MG/1
TABLET ORAL
Qty: 90 TABLET | Refills: 0 | Status: SHIPPED | OUTPATIENT
Start: 2018-10-23 | End: 2019-01-04 | Stop reason: ALTCHOICE

## 2018-11-04 DIAGNOSIS — E78.5 HYPERLIPIDEMIA, UNSPECIFIED HYPERLIPIDEMIA TYPE: ICD-10-CM

## 2018-11-07 RX ORDER — ATORVASTATIN CALCIUM 20 MG/1
TABLET, FILM COATED ORAL
Qty: 90 TABLET | Refills: 2 | Status: SHIPPED | OUTPATIENT
Start: 2018-11-07 | End: 2019-08-04 | Stop reason: SDUPTHER

## 2019-01-03 ENCOUNTER — HOSPITAL ENCOUNTER (OUTPATIENT)
Dept: GENERAL RADIOLOGY | Age: 74
Discharge: HOME | End: 2019-01-05
Payer: MEDICARE

## 2019-01-03 DIAGNOSIS — R52 PAIN: ICD-10-CM

## 2019-01-03 PROCEDURE — 72050 X-RAY EXAM NECK SPINE 4/5VWS: CPT

## 2019-01-25 RX ORDER — IRBESARTAN 300 MG/1
TABLET ORAL
Qty: 90 TABLET | Refills: 0 | Status: SHIPPED | OUTPATIENT
Start: 2019-01-25 | End: 2020-12-02 | Stop reason: ALTCHOICE

## 2019-03-18 ENCOUNTER — OFFICE VISIT (OUTPATIENT)
Dept: FAMILY MEDICINE CLINIC | Age: 74
End: 2019-03-18
Payer: MEDICARE

## 2019-03-18 VITALS
SYSTOLIC BLOOD PRESSURE: 138 MMHG | BODY MASS INDEX: 30.62 KG/M2 | HEIGHT: 68 IN | OXYGEN SATURATION: 98 % | HEART RATE: 84 BPM | WEIGHT: 202 LBS | DIASTOLIC BLOOD PRESSURE: 78 MMHG

## 2019-03-18 DIAGNOSIS — G89.29 CHRONIC NECK PAIN: ICD-10-CM

## 2019-03-18 DIAGNOSIS — M54.5 CHRONIC LOW BACK PAIN, UNSPECIFIED BACK PAIN LATERALITY, WITH SCIATICA PRESENCE UNSPECIFIED: ICD-10-CM

## 2019-03-18 DIAGNOSIS — R53.83 FATIGUE, UNSPECIFIED TYPE: ICD-10-CM

## 2019-03-18 DIAGNOSIS — M54.2 CHRONIC NECK PAIN: ICD-10-CM

## 2019-03-18 DIAGNOSIS — G89.29 CHRONIC LOW BACK PAIN, UNSPECIFIED BACK PAIN LATERALITY, WITH SCIATICA PRESENCE UNSPECIFIED: ICD-10-CM

## 2019-03-18 DIAGNOSIS — R53.83 FATIGUE, UNSPECIFIED TYPE: Primary | ICD-10-CM

## 2019-03-18 LAB
ALBUMIN SERPL-MCNC: 4.2 G/DL (ref 3.5–4.6)
ALP BLD-CCNC: 45 U/L (ref 35–104)
ALT SERPL-CCNC: 24 U/L (ref 0–41)
ANION GAP SERPL CALCULATED.3IONS-SCNC: 11 MEQ/L (ref 9–15)
AST SERPL-CCNC: 25 U/L (ref 0–40)
BILIRUB SERPL-MCNC: 0.4 MG/DL (ref 0.2–0.7)
BUN BLDV-MCNC: 14 MG/DL (ref 8–23)
CALCIUM SERPL-MCNC: 9.2 MG/DL (ref 8.5–9.9)
CHLORIDE BLD-SCNC: 105 MEQ/L (ref 95–107)
CO2: 27 MEQ/L (ref 20–31)
CREAT SERPL-MCNC: 0.82 MG/DL (ref 0.7–1.2)
GFR AFRICAN AMERICAN: >60
GFR NON-AFRICAN AMERICAN: >60
GLOBULIN: 2.9 G/DL (ref 2.3–3.5)
GLUCOSE BLD-MCNC: 124 MG/DL (ref 70–99)
POTASSIUM SERPL-SCNC: 4.1 MEQ/L (ref 3.4–4.9)
SODIUM BLD-SCNC: 143 MEQ/L (ref 135–144)
TOTAL PROTEIN: 7.1 G/DL (ref 6.3–8)
TSH SERPL DL<=0.05 MIU/L-ACNC: 0.76 UIU/ML (ref 0.44–3.86)

## 2019-03-18 PROCEDURE — 99213 OFFICE O/P EST LOW 20 MIN: CPT | Performed by: FAMILY MEDICINE

## 2019-03-18 RX ORDER — CELECOXIB 200 MG/1
200 CAPSULE ORAL DAILY
Qty: 30 CAPSULE | Refills: 5 | Status: SHIPPED | OUTPATIENT
Start: 2019-03-18 | End: 2019-03-19 | Stop reason: SDUPTHER

## 2019-03-18 ASSESSMENT — PATIENT HEALTH QUESTIONNAIRE - PHQ9
1. LITTLE INTEREST OR PLEASURE IN DOING THINGS: 1
SUM OF ALL RESPONSES TO PHQ QUESTIONS 1-9: 2
2. FEELING DOWN, DEPRESSED OR HOPELESS: 1
SUM OF ALL RESPONSES TO PHQ QUESTIONS 1-9: 2
SUM OF ALL RESPONSES TO PHQ9 QUESTIONS 1 & 2: 2

## 2019-03-19 DIAGNOSIS — G89.29 CHRONIC NECK PAIN: ICD-10-CM

## 2019-03-19 DIAGNOSIS — M54.2 CHRONIC NECK PAIN: ICD-10-CM

## 2019-03-19 DIAGNOSIS — G89.29 CHRONIC LOW BACK PAIN, UNSPECIFIED BACK PAIN LATERALITY, WITH SCIATICA PRESENCE UNSPECIFIED: ICD-10-CM

## 2019-03-19 DIAGNOSIS — M54.5 CHRONIC LOW BACK PAIN, UNSPECIFIED BACK PAIN LATERALITY, WITH SCIATICA PRESENCE UNSPECIFIED: ICD-10-CM

## 2019-03-20 RX ORDER — CELECOXIB 200 MG/1
200 CAPSULE ORAL DAILY
Qty: 90 CAPSULE | Refills: 5 | Status: SHIPPED | OUTPATIENT
Start: 2019-03-20 | End: 2020-12-02 | Stop reason: ALTCHOICE

## 2019-03-21 LAB — TESTOSTERONE TOTAL-MALE: 498 NG/DL (ref 300–720)

## 2019-03-27 DIAGNOSIS — R73.09 ELEVATED GLUCOSE: Primary | ICD-10-CM

## 2019-03-28 DIAGNOSIS — R73.09 ELEVATED GLUCOSE: ICD-10-CM

## 2019-03-28 LAB — HBA1C MFR BLD: 5.5 % (ref 4.8–5.9)

## 2019-04-18 ENCOUNTER — OFFICE VISIT (OUTPATIENT)
Dept: FAMILY MEDICINE CLINIC | Age: 74
End: 2019-04-18
Payer: MEDICARE

## 2019-04-18 VITALS
HEART RATE: 82 BPM | OXYGEN SATURATION: 96 % | HEIGHT: 68 IN | SYSTOLIC BLOOD PRESSURE: 136 MMHG | BODY MASS INDEX: 30.71 KG/M2 | WEIGHT: 202.6 LBS | DIASTOLIC BLOOD PRESSURE: 82 MMHG

## 2019-04-18 DIAGNOSIS — R53.82 CHRONIC FATIGUE: Primary | ICD-10-CM

## 2019-04-18 DIAGNOSIS — E78.00 PURE HYPERCHOLESTEROLEMIA: ICD-10-CM

## 2019-04-18 DIAGNOSIS — I10 ESSENTIAL HYPERTENSION: ICD-10-CM

## 2019-04-18 PROCEDURE — 99213 OFFICE O/P EST LOW 20 MIN: CPT | Performed by: FAMILY MEDICINE

## 2019-04-18 PROCEDURE — 96372 THER/PROPH/DIAG INJ SC/IM: CPT | Performed by: FAMILY MEDICINE

## 2019-04-18 RX ORDER — CYANOCOBALAMIN 1000 UG/ML
1000 INJECTION INTRAMUSCULAR; SUBCUTANEOUS ONCE
Status: COMPLETED | OUTPATIENT
Start: 2019-04-18 | End: 2019-04-18

## 2019-04-18 RX ORDER — ASPIRIN 325 MG
325 TABLET, DELAYED RELEASE (ENTERIC COATED) ORAL DAILY
Qty: 30 TABLET | Refills: 3 | COMMUNITY
Start: 2019-04-18 | End: 2022-01-24

## 2019-04-18 RX ADMIN — CYANOCOBALAMIN 1000 MCG: 1000 INJECTION INTRAMUSCULAR; SUBCUTANEOUS at 15:13

## 2019-04-18 NOTE — PROGRESS NOTES
Chief Complaint   Patient presents with    Arthritis     medication for arthritis not working, celebrex, hands swelling, still having pain in neck and back      Tj Cornejo is a 72 y.o. male    celebrex doesn't seem to work    Ongoing neck and back pain  Used to be on opiates    Was getting injections  Chronic pain of neck and back    Main complaints are pain        Patient Active Problem List   Diagnosis    GERD (gastroesophageal reflux disease)    Hyperlipidemia    Hypertension    Chronic nasal congestion    Prostatitis    Low back pain    Hand pain    High risk medication use - OARRS PM&R 5/9/17, 07/12/17 OARRS PM&R, 01/24/17 Med Contract PM&R, 07/13/17 Urine Drug Screen: negative PM&R    DDD (degenerative disc disease), lumbar    Myalgia    Sciatic pain    Greater trochanteric bursitis    C7 radiculopathy    Bilateral carpal tunnel syndrome    Ulnar neuropathy of left upper extremity    Lateral epicondylitis (tennis elbow)    Cervical radiculopathy at C8    Displacement of cervical intervertebral disc without myelopathy    Displacement of lumbar intervertebral disc without myelopathy    Primary localized osteoarthrosis, other specified sites    Patient overweight    Lumbosacral spondylosis without myelopathy    Cervical spondylosis without myelopathy    Excess weight    Anxiety    Tardy ulnar nerve palsy    Actinic keratoses    RLS (restless legs syndrome)    Herpes labialis    Pneumonia    Chronic anemia       Current Outpatient Medications   Medication Sig Dispense Refill    aspirin 325 MG EC tablet Take 1 tablet by mouth daily 30 tablet 3    celecoxib (CELEBREX) 200 MG capsule TAKE 1 CAPSULE BY MOUTH DAILY 90 capsule 5    irbesartan (AVAPRO) 300 MG tablet TAKE 1 TABLET BY MOUTH NIGHTLY 90 tablet 0    atorvastatin (LIPITOR) 20 MG tablet TAKE 1 TABLET DAILY 90 tablet 2    omeprazole (PRILOSEC) 40 MG delayed release capsule TAKE 1 CAPSULE DAILY 90 capsule 3    Multiple Vitamin (MULTI-DAY) TABS Take by mouth      vitamin C (ASCORBIC ACID) 500 MG tablet Take 500 mg by mouth daily      vitamin D (CHOLECALCIFEROL) 1000 UNIT TABS tablet Take 1,000 Units by mouth daily      vitamin E 1000 units capsule Take 1,000 Units by mouth daily      KRILL OIL PO Take 750 mg by mouth daily       Coenzyme Q10 (COQ10 PO) Take 100 mg by mouth daily       calcium carbonate (OSCAL) 500 MG TABS tablet Take 500 mg by mouth daily. No current facility-administered medications for this visit. Patient's medications, allergies, past medical, surgical, social and family histories were reviewed and updated as appropriate. Review of Systems:   General ROS: negative for - chills, fatigue, fever, malaise, weight gain or weight loss  Respiratory ROS: no cough, shortness of breath, or wheezing  Cardiovascular ROS: no chest pain or dyspnea on exertion  Gastrointestinal ROS: no abdominal pain, change in bowel habits, or black or bloody stools  Genito-Urinary ROS: no dysuria, trouble voiding  Musculoskeletal ROS: per HPI    In general patient otherwise reports feeling well.      Physical Exam:  /82 (Site: Left Upper Arm)   Pulse 82   Ht 5' 8\" (1.727 m)   Wt 202 lb 9.6 oz (91.9 kg)   SpO2 96%   BMI 30.81 kg/m²     Gen: Well, NAD, Alert, Oriented x 3   HEENT: EOMI, eyes clear, MMM  Skin: no rash or jaundice  Neck: no significant lymphadenopathy or thyromegaly  Lungs: CTA B w/out Rales/Wheezes/Rhonchi, Good respiratory effort   Heart: RRR, S1S2, w/out M/R/G, non-displaced PMI   Psych: a bit anxious at times, but generally euthymic    Lab Results   Component Value Date    WBC 13.0 01/04/2019    HGB 12.7 (A) 01/04/2019    HCT 38.0 (A) 01/04/2019     01/04/2019    CHOL 104 07/16/2018    TRIG 93 07/16/2018    HDL 28 (A) 07/16/2018    ALT 24 03/18/2019    AST 25 03/18/2019     03/18/2019    K 4.1 03/18/2019     03/18/2019    CREATININE 0.82 03/18/2019    BUN 14 03/18/2019 CO2 27 03/18/2019    TSH 0.761 03/18/2019    PSA 0.67 08/25/2017    INR 1.15 (H) 07/17/2018    LABA1C 5.5 03/28/2019         A&P   Diagnosis Orders   1. Chronic fatigue     2. Pure hypercholesterolemia     3.  Essential hypertension       BP is under  control    Reviewed labs with him    Consider CBD oil or medical marijuana    Explained not starting back on opiates given history    Edita Mohr MD

## 2019-08-04 DIAGNOSIS — E78.5 HYPERLIPIDEMIA, UNSPECIFIED HYPERLIPIDEMIA TYPE: ICD-10-CM

## 2019-08-05 RX ORDER — ATORVASTATIN CALCIUM 20 MG/1
TABLET, FILM COATED ORAL
Qty: 90 TABLET | Refills: 2 | Status: SHIPPED | OUTPATIENT
Start: 2019-08-05 | End: 2020-05-01

## 2019-09-08 DIAGNOSIS — K21.9 GASTROESOPHAGEAL REFLUX DISEASE WITHOUT ESOPHAGITIS: ICD-10-CM

## 2019-09-09 RX ORDER — OMEPRAZOLE 40 MG/1
CAPSULE, DELAYED RELEASE ORAL
Qty: 90 CAPSULE | Refills: 4 | Status: SHIPPED | OUTPATIENT
Start: 2019-09-09 | End: 2021-01-22

## 2019-10-09 ENCOUNTER — CARE COORDINATION (OUTPATIENT)
Dept: CARE COORDINATION | Age: 74
End: 2019-10-09

## 2020-04-16 ENCOUNTER — TELEPHONE (OUTPATIENT)
Dept: FAMILY MEDICINE CLINIC | Age: 75
End: 2020-04-16

## 2020-04-27 RX ORDER — OLMESARTAN MEDOXOMIL 40 MG/1
40 TABLET ORAL DAILY
Qty: 90 TABLET | Refills: 1 | Status: SHIPPED | OUTPATIENT
Start: 2020-04-27 | End: 2020-10-29 | Stop reason: SDUPTHER

## 2020-05-01 RX ORDER — ATORVASTATIN CALCIUM 20 MG/1
TABLET, FILM COATED ORAL
Qty: 90 TABLET | Refills: 2 | Status: SHIPPED | OUTPATIENT
Start: 2020-05-01 | End: 2021-01-26

## 2020-10-11 ENCOUNTER — VIRTUAL VISIT (OUTPATIENT)
Dept: FAMILY MEDICINE CLINIC | Age: 75
End: 2020-10-11
Payer: MEDICARE

## 2020-10-11 PROCEDURE — 99442 PR PHYS/QHP TELEPHONE EVALUATION 11-20 MIN: CPT | Performed by: NURSE PRACTITIONER

## 2020-10-11 ASSESSMENT — PATIENT HEALTH QUESTIONNAIRE - PHQ9
2. FEELING DOWN, DEPRESSED OR HOPELESS: 0
1. LITTLE INTEREST OR PLEASURE IN DOING THINGS: 0
SUM OF ALL RESPONSES TO PHQ9 QUESTIONS 1 & 2: 0
SUM OF ALL RESPONSES TO PHQ QUESTIONS 1-9: 0
SUM OF ALL RESPONSES TO PHQ QUESTIONS 1-9: 0

## 2020-10-11 ASSESSMENT — ENCOUNTER SYMPTOMS
RHINORRHEA: 0
SINUS PAIN: 0
ABDOMINAL PAIN: 0
WHEEZING: 0
SINUS PRESSURE: 0
SORE THROAT: 0
COUGH: 0
SHORTNESS OF BREATH: 0
NAUSEA: 0
CHEST TIGHTNESS: 0
DIARRHEA: 0

## 2020-10-11 NOTE — PROGRESS NOTES
TELEHEALTH EVALUATION -- Audio/Visual (During SCLOL-62 public health emergency)    -   Keely Rodney is a 70 y.o. male being evaluated by a Virtual Visit (video visit) encounter to address concerns as mentioned above. A caregiver was present when appropriate. Due to this being a TeleHealth encounter (During MRMTN-23 public health emergency), evaluation of the following organ systems was limited: Vitals/Constitutional/EENT/Resp/CV/GI//MS/Neuro/Skin/Heme-Lymph-Imm. Pursuant to the emergency declaration under the [address] [place] [address], [address] [place] [address] authority and the [employer] Act, this Virtual Visit was conducted with patient's (and/or legal guardian's) consent, to reduce the patient's risk of exposure to COVID-19 and provide necessary medical care. The patient (and/or legal guardian) has also been advised to contact this office for worsening conditions or problems, and seek emergency medical treatment and/or call 911 if deemed necessary. Patient was contacted and agreed to proceed with a virtual visit via Telephone Visit  The risks and benefits of converting to a virtual visit were discussed in light of the current infectious disease epidemic. Patient also understood that insurance coverage and co-pays are up to their individual insurance plans. Patient was located at their home. Provider was located at their office.      10/11/2020  Keely Rodney (:  1945) has requested an audio/video evaluation for the following concern(s):    HPI  VV audio for COVID-19 test after recent exposure  [place] after watching great grandson overnight   The great grandson woke up with a cough and congestion while at their home  Has learned that this child's father tested positive for COVID-19  The great grandson doesn't live with the parent but was around him recently     Hasn't taken temperature at home  No change to taste to cough or smell  Denies cough or chest discomfort   Denies GI symptoms   Would like COVID-19 test for exposure         Review of Systems   Constitutional: Negative for activity change, appetite change, chills, diaphoresis, fatigue and fever. HENT: Negative for congestion, ear pain, rhinorrhea, sinus pressure, sinus pain, sneezing and sore throat. Respiratory: Negative for cough, chest tightness, shortness of breath and wheezing. Cardiovascular: Negative for chest pain and palpitations. Gastrointestinal: Negative for abdominal pain, diarrhea and nausea. Musculoskeletal: Negative for myalgias. Skin: Negative for rash. Neurological: Negative for dizziness, light-headedness and headaches. Prior to Visit Medications    Medication Sig Taking? Authorizing Provider   atorvastatin (LIPITOR) 20 MG tablet TAKE 1 TABLET DAILY Yes Anatoliy Montero MD   olmesartan (BENICAR) 40 MG tablet Take 1 tablet by mouth daily Yes Anatoliy Montero MD   omeprazole (PRILOSEC) 40 MG delayed release capsule TAKE 1 CAPSULE DAILY Yes Anatoliy Montero MD   Multiple Vitamin (MULTI-DAY) TABS Take by mouth Yes Historical Provider, MD   vitamin C (ASCORBIC ACID) 500 MG tablet Take 500 mg by mouth daily Yes Historical Provider, MD   vitamin D (CHOLECALCIFEROL) 1000 UNIT TABS tablet Take 1,000 Units by mouth daily Yes Historical Provider, MD   vitamin E 1000 units capsule Take 1,000 Units by mouth daily Yes Historical Provider, MD   KRILL OIL PO Take 750 mg by mouth daily  Yes Historical Provider, MD   Coenzyme Q10 (COQ10 PO) Take 100 mg by mouth daily  Yes Historical Provider, MD   calcium carbonate (OSCAL) 500 MG TABS tablet Take 500 mg by mouth daily.    Yes Historical Provider, MD   aspirin 325 MG EC tablet Take 1 tablet by mouth daily  Anatoliy Montero MD   celecoxib (CELEBREX) 200 MG capsule TAKE 1 CAPSULE BY MOUTH DAILY  Patient not taking: Reported on 10/11/2020  Anatoliy Montero MD   irbesartan (AVAPRO) 300 MG tablet TAKE 1 TABLET BY MOUTH Transportation needs     Medical: Not on file     Non-medical: Not on file   Tobacco Use    Smoking status: Current Some Day Smoker     Packs/day: 0.25     Years: 5.00     Pack years: 1.25     Types: Cigars    Smokeless tobacco: Former User     Quit date: 1/1/1969   Substance and Sexual Activity    Alcohol use: Yes     Alcohol/week: 0.0 standard drinks     Comment: rare, used to drink heavily in his 29's    Drug use: No    Sexual activity: Not on file   Lifestyle    Physical activity     Days per week: Not on file     Minutes per session: Not on file    Stress: Not on file   Relationships    Social connections     Talks on phone: Not on file     Gets together: Not on file     Attends Sikh service: Not on file     Active member of club or organization: Not on file     Attends meetings of clubs or organizations: Not on file     Relationship status: Not on file    Intimate partner violence     Fear of current or ex partner: Not on file     Emotionally abused: Not on file     Physically abused: Not on file     Forced sexual activity: Not on file   Other Topics Concern    Not on file   Social History Narrative    Not on file     Family History   Problem Relation Age of Onset    Emphysema Mother     Heart Disease Father     Other Father         malaria    Heart Attack Father     No Known Problems Sister     Heart Attack Brother     Heart Disease Brother     Parkinsonism Sister     Heart Defect Daughter     No Known Problems Son      Allergies   Allergen Reactions    Flexeril [Cyclobenzaprine] Other (See Comments)     Becomes estrada    Lyrica [Pregabalin]     Gabapentin Swelling     Funny feeling    Topamax [Topiramate] Other (See Comments)     Funny feeling    Voltaren [Diclofenac] Nausea Only       PMH, Surgical Hx, Family Hx, and Social Hx reviewed and updated. PHYSICAL EXAMINATION: N/A. VV Audio      ASSESSMENT/PLAN:  Assessment & Plan   Val Galvan was seen today for other.     Diagnoses and all orders for this visit:    Exposure to COVID-19 virus/Screening for viral disease  -     COVID-19 Ambulatory; Future          Orders Placed This Encounter   Procedures    COVID-19 Ambulatory     Standing Status:   Future     Standing Expiration Date:   10/11/2021     Scheduling Instructions:      Saline media preferred given current shortage of viral transport media but both acceptable     Order Specific Question:   Is this test for diagnosis or screening? Answer:   Screening     Order Specific Question:   Symptomatic for COVID-19 as defined by CDC? Answer:   No     Order Specific Question:   Date of Symptom Onset     Answer:   N/A     Order Specific Question:   Hospitalized for COVID-19? Answer:   No     Order Specific Question:   Admitted to ICU for COVID-19? Answer:   No     Order Specific Question:   Employed in healthcare setting? Answer:   No     Order Specific Question:   Resident in a congregate (group) care setting? Answer:   No     Order Specific Question:   Pregnant: Answer:   No     Order Specific Question:   Previously tested for COVID-19? Answer:   No     No orders of the defined types were placed in this encounter. There are no discontinued medications. Return if symptoms worsen or fail to improve, for follow up with PCP. Discussed with patient about COVID-19 testing after an exposure & when to come in to have the test sample taken. Discussed taking temperature twice a day and monitoring for symptoms. Reviewed with the patient: current clinical status. Discussed with patient COVID-19 s/s. Pt aware to remain home until he hears from us about the result. Pt instructed on red flag s/s to go to the ER for or to call 911. Pt verbalized understanding. When to call for help  Call 911 anytime you think you may need emergency care. For example, call if:  · You have severe trouble breathing. · You have severe dehydration.          I have reviewed the

## 2020-10-21 ENCOUNTER — TELEPHONE (OUTPATIENT)
Dept: FAMILY MEDICINE CLINIC | Age: 75
End: 2020-10-21

## 2020-10-29 RX ORDER — OLMESARTAN MEDOXOMIL 40 MG/1
40 TABLET ORAL DAILY
Qty: 90 TABLET | Refills: 1 | Status: SHIPPED | OUTPATIENT
Start: 2020-10-29 | End: 2021-04-27 | Stop reason: SDUPTHER

## 2020-12-02 ENCOUNTER — OFFICE VISIT (OUTPATIENT)
Dept: FAMILY MEDICINE CLINIC | Age: 75
End: 2020-12-02
Payer: MEDICARE

## 2020-12-02 VITALS
BODY MASS INDEX: 28.79 KG/M2 | DIASTOLIC BLOOD PRESSURE: 62 MMHG | SYSTOLIC BLOOD PRESSURE: 110 MMHG | OXYGEN SATURATION: 97 % | TEMPERATURE: 97 F | WEIGHT: 190 LBS | HEART RATE: 72 BPM | HEIGHT: 68 IN

## 2020-12-02 DIAGNOSIS — K21.9 GASTROESOPHAGEAL REFLUX DISEASE WITHOUT ESOPHAGITIS: ICD-10-CM

## 2020-12-02 DIAGNOSIS — E78.5 HYPERLIPIDEMIA, UNSPECIFIED HYPERLIPIDEMIA TYPE: ICD-10-CM

## 2020-12-02 DIAGNOSIS — Z12.5 SCREENING PSA (PROSTATE SPECIFIC ANTIGEN): ICD-10-CM

## 2020-12-02 LAB
ALBUMIN SERPL-MCNC: 4.6 G/DL (ref 3.5–4.6)
ALP BLD-CCNC: 43 U/L (ref 35–104)
ALT SERPL-CCNC: 16 U/L (ref 0–41)
ANION GAP SERPL CALCULATED.3IONS-SCNC: 13 MEQ/L (ref 9–15)
AST SERPL-CCNC: 15 U/L (ref 0–40)
BILIRUB SERPL-MCNC: 0.6 MG/DL (ref 0.2–0.7)
BUN BLDV-MCNC: 18 MG/DL (ref 8–23)
CALCIUM SERPL-MCNC: 9.8 MG/DL (ref 8.5–9.9)
CHLORIDE BLD-SCNC: 107 MEQ/L (ref 95–107)
CHOLESTEROL, TOTAL: 160 MG/DL (ref 0–199)
CO2: 23 MEQ/L (ref 20–31)
CREAT SERPL-MCNC: 0.96 MG/DL (ref 0.7–1.2)
GFR AFRICAN AMERICAN: >60
GFR NON-AFRICAN AMERICAN: >60
GLOBULIN: 2.6 G/DL (ref 2.3–3.5)
GLUCOSE BLD-MCNC: 101 MG/DL (ref 70–99)
HCT VFR BLD CALC: 40.1 % (ref 42–52)
HDLC SERPL-MCNC: 62 MG/DL (ref 40–59)
HEMOGLOBIN: 13.9 G/DL (ref 14–18)
LDL CHOLESTEROL CALCULATED: 81 MG/DL (ref 0–129)
MCH RBC QN AUTO: 31.5 PG (ref 27–31.3)
MCHC RBC AUTO-ENTMCNC: 34.7 % (ref 33–37)
MCV RBC AUTO: 90.7 FL (ref 80–100)
PDW BLD-RTO: 13.9 % (ref 11.5–14.5)
PLATELET # BLD: 193 K/UL (ref 130–400)
POTASSIUM SERPL-SCNC: 4.1 MEQ/L (ref 3.4–4.9)
PROSTATE SPECIFIC ANTIGEN: 0.83 NG/ML (ref 0–6.22)
RBC # BLD: 4.42 M/UL (ref 4.7–6.1)
SODIUM BLD-SCNC: 143 MEQ/L (ref 135–144)
TOTAL PROTEIN: 7.2 G/DL (ref 6.3–8)
TRIGL SERPL-MCNC: 84 MG/DL (ref 0–150)
WBC # BLD: 7.6 K/UL (ref 4.8–10.8)

## 2020-12-02 PROCEDURE — 99214 OFFICE O/P EST MOD 30 MIN: CPT | Performed by: FAMILY MEDICINE

## 2020-12-02 RX ORDER — DULOXETIN HYDROCHLORIDE 60 MG/1
60 CAPSULE, DELAYED RELEASE ORAL EVERY EVENING
Qty: 30 CAPSULE | Refills: 5 | Status: SHIPPED | OUTPATIENT
Start: 2020-12-02 | End: 2021-01-05

## 2020-12-02 SDOH — ECONOMIC STABILITY: TRANSPORTATION INSECURITY
IN THE PAST 12 MONTHS, HAS THE LACK OF TRANSPORTATION KEPT YOU FROM MEDICAL APPOINTMENTS OR FROM GETTING MEDICATIONS?: NO

## 2020-12-02 SDOH — ECONOMIC STABILITY: FOOD INSECURITY: WITHIN THE PAST 12 MONTHS, THE FOOD YOU BOUGHT JUST DIDN'T LAST AND YOU DIDN'T HAVE MONEY TO GET MORE.: NEVER TRUE

## 2020-12-02 SDOH — ECONOMIC STABILITY: FOOD INSECURITY: WITHIN THE PAST 12 MONTHS, YOU WORRIED THAT YOUR FOOD WOULD RUN OUT BEFORE YOU GOT MONEY TO BUY MORE.: NEVER TRUE

## 2020-12-02 SDOH — ECONOMIC STABILITY: TRANSPORTATION INSECURITY
IN THE PAST 12 MONTHS, HAS LACK OF TRANSPORTATION KEPT YOU FROM MEETINGS, WORK, OR FROM GETTING THINGS NEEDED FOR DAILY LIVING?: NO

## 2020-12-02 SDOH — ECONOMIC STABILITY: INCOME INSECURITY: HOW HARD IS IT FOR YOU TO PAY FOR THE VERY BASICS LIKE FOOD, HOUSING, MEDICAL CARE, AND HEATING?: NOT HARD AT ALL

## 2020-12-02 NOTE — PROGRESS NOTES
Chief Complaint   Patient presents with    Check-Up     Pt stated he for check-up     Arthritis     Pt stated his arthritis in both hands is getting worse, hard to make a fist. Also arthritis in his left side of neck going to left shoulder.      Health Maintenance     Pt due for AWV     Isabela Crow is a 70 y.o. male      Patient was contacted due to not being seen since early 2019    Htn/lipid/GERD, but his main complaints as per previous are pain issues    Was previously with pain mgmt and on opiates    Had previous surgery on his left arm  Having pain/locks up when he reaches behind    CBD oil did not work  Spent quite a bit of money trying to find something    Issues with gabapentin and lyrica in past (intolerance)    Doesn't recall cymbalta trial     Patient Active Problem List   Diagnosis    GERD (gastroesophageal reflux disease)    Hyperlipidemia    Hypertension    Chronic nasal congestion    Prostatitis    Low back pain    Hand pain    High risk medication use - OARRS PM&R 5/9/17, 07/12/17 OARRS PM&R, 01/24/17 Med Contract PM&R, 07/13/17 Urine Drug Screen: negative PM&R    DDD (degenerative disc disease), lumbar    Myalgia    Sciatic pain    Greater trochanteric bursitis    C7 radiculopathy    Bilateral carpal tunnel syndrome    Ulnar neuropathy of left upper extremity    Lateral epicondylitis (tennis elbow)    Cervical radiculopathy at C8    Displacement of cervical intervertebral disc without myelopathy    Displacement of lumbar intervertebral disc without myelopathy    Primary localized osteoarthrosis, other specified sites    Patient overweight    Lumbosacral spondylosis without myelopathy    Cervical spondylosis without myelopathy    Excess weight    Anxiety    Tardy ulnar nerve palsy    Actinic keratoses    RLS (restless legs syndrome)    Herpes labialis    Pneumonia    Chronic anemia       Current Outpatient Medications   Medication Sig Dispense Refill    Rales/Wheezes/Rhonchi, Good respiratory effort   Heart: RRR, S1S2, w/out M/R/G, non-displaced PMI   Psych: generally euthymic    Lab Results   Component Value Date    WBC 13.0 01/04/2019    HGB 12.7 (A) 01/04/2019    HCT 38.0 (A) 01/04/2019     01/04/2019    CHOL 104 07/16/2018    TRIG 93 07/16/2018    HDL 28 (A) 07/16/2018    ALT 24 03/18/2019    AST 25 03/18/2019     03/18/2019    K 4.1 03/18/2019     03/18/2019    CREATININE 0.82 03/18/2019    BUN 14 03/18/2019    CO2 27 03/18/2019    TSH 0.761 03/18/2019    PSA 0.67 08/25/2017    INR 1.15 (H) 07/17/2018    LABA1C 5.5 03/28/2019         A&P   Diagnosis Orders   1. Hyperlipidemia, unspecified hyperlipidemia type  Comprehensive Metabolic Panel    Lipid Panel   2. Gastroesophageal reflux disease without esophagitis  CBC   3. Screening PSA (prostate specific antigen)  PSA Screening   4. Chronic musculoskeletal pain  DULoxetine (CYMBALTA) 60 MG extended release capsule   5. Hypertension, unspecified type     6. Chronic bilateral low back pain with sciatica, sciatica laterality unspecified     7.  Myalgia       BP is under control    Fasting labs    Trial of cymbalta    Motrin/tylenol combo is part of his daily routine    Check response to cymbalta in 1 month        Woodard Krabbe, MD

## 2021-01-05 ENCOUNTER — OFFICE VISIT (OUTPATIENT)
Dept: FAMILY MEDICINE CLINIC | Age: 76
End: 2021-01-05
Payer: MEDICARE

## 2021-01-05 VITALS
HEIGHT: 68 IN | OXYGEN SATURATION: 96 % | SYSTOLIC BLOOD PRESSURE: 128 MMHG | BODY MASS INDEX: 30.31 KG/M2 | WEIGHT: 200 LBS | HEART RATE: 72 BPM | DIASTOLIC BLOOD PRESSURE: 68 MMHG | TEMPERATURE: 98 F

## 2021-01-05 DIAGNOSIS — G89.29 CHRONIC MUSCULOSKELETAL PAIN: Primary | ICD-10-CM

## 2021-01-05 DIAGNOSIS — M79.18 CHRONIC MUSCULOSKELETAL PAIN: Primary | ICD-10-CM

## 2021-01-05 PROCEDURE — 99212 OFFICE O/P EST SF 10 MIN: CPT | Performed by: FAMILY MEDICINE

## 2021-01-05 NOTE — PROGRESS NOTES
MG tablet TAKE 1 TABLET DAILY 90 tablet 2    omeprazole (PRILOSEC) 40 MG delayed release capsule TAKE 1 CAPSULE DAILY 90 capsule 4    aspirin 325 MG EC tablet Take 1 tablet by mouth daily 30 tablet 3    Multiple Vitamin (MULTI-DAY) TABS Take by mouth      vitamin C (ASCORBIC ACID) 500 MG tablet Take 500 mg by mouth daily      vitamin D (CHOLECALCIFEROL) 1000 UNIT TABS tablet Take 1,000 Units by mouth daily      vitamin E 1000 units capsule Take 1,000 Units by mouth daily      KRILL OIL PO Take 750 mg by mouth daily       Coenzyme Q10 (COQ10 PO) Take 100 mg by mouth daily       calcium carbonate (OSCAL) 500 MG TABS tablet Take 500 mg by mouth daily. No current facility-administered medications for this visit. Patient's medications, allergies, past medical, surgical, social and family histories were reviewed and updated as appropriate. Review of Systems:   General ROS: negative for - chills, fatigue, fever, malaise, weight gain or weight loss  Respiratory ROS: no cough, shortness of breath, or wheezing  Cardiovascular ROS: no chest pain or dyspnea on exertion  Gastrointestinal ROS: no abdominal pain, change in bowel habits, or black or bloody stools  Genito-Urinary ROS: no dysuria, trouble voiding  Musculoskeletal ROS: per HPI    In general patient otherwise reports feeling well.      Physical Exam:  /68 (Site: Left Upper Arm)   Pulse 72   Temp 98 °F (36.7 °C)   Ht 5' 8\" (1.727 m)   Wt 200 lb (90.7 kg)   SpO2 96%   BMI 30.41 kg/m²     Gen: Well, NAD, Alert, Oriented x 3   HEENT: EOMI, eyes clear, MMM  Skin: no rash or jaundice  Neck: no significant lymphadenopathy or thyromegaly  Lungs: CTA B w/out Rales/Wheezes/Rhonchi, Good respiratory effort   Heart: RRR, S1S2, w/out M/R/G, non-displaced PMI   Psych: reports dysthymic     Lab Results   Component Value Date    WBC 7.6 12/02/2020    HGB 13.9 (L) 12/02/2020    HCT 40.1 (L) 12/02/2020     12/02/2020    CHOL 160 12/02/2020 TRIG 84 12/02/2020    HDL 62 (H) 12/02/2020    ALT 16 12/02/2020    AST 15 12/02/2020     12/02/2020    K 4.1 12/02/2020     12/02/2020    CREATININE 0.96 12/02/2020    BUN 18 12/02/2020    CO2 23 12/02/2020    TSH 0.761 03/18/2019    PSA 0.83 12/02/2020    INR 1.15 (H) 07/17/2018    LABA1C 5.5 03/28/2019         A&P   Diagnosis Orders   1.  Chronic musculoskeletal pain       I don't have much more to offer with his sensitivity to medications unfortunately     Declines anything that could be \"mind altering\"    Has tried multiple medications and is unable to tolerate side effects     Motrin/tylenol combo is part of his daily routine for now          Louisa Goel MD

## 2021-01-21 DIAGNOSIS — K21.9 GASTROESOPHAGEAL REFLUX DISEASE WITHOUT ESOPHAGITIS: ICD-10-CM

## 2021-01-22 RX ORDER — OMEPRAZOLE 40 MG/1
CAPSULE, DELAYED RELEASE ORAL
Qty: 90 CAPSULE | Refills: 3 | Status: SHIPPED | OUTPATIENT
Start: 2021-01-22 | End: 2022-01-26

## 2021-01-26 DIAGNOSIS — E78.5 HYPERLIPIDEMIA, UNSPECIFIED HYPERLIPIDEMIA TYPE: ICD-10-CM

## 2021-01-26 RX ORDER — ATORVASTATIN CALCIUM 20 MG/1
TABLET, FILM COATED ORAL
Qty: 90 TABLET | Refills: 3 | Status: SHIPPED | OUTPATIENT
Start: 2021-01-26 | End: 2022-01-21

## 2021-04-08 ENCOUNTER — TELEPHONE (OUTPATIENT)
Dept: PRIMARY CARE CLINIC | Age: 76
End: 2021-04-08

## 2021-04-27 RX ORDER — OLMESARTAN MEDOXOMIL 40 MG/1
40 TABLET ORAL DAILY
Qty: 90 TABLET | Refills: 1 | Status: SHIPPED | OUTPATIENT
Start: 2021-04-27 | End: 2021-11-03

## 2021-07-14 ENCOUNTER — OFFICE VISIT (OUTPATIENT)
Dept: FAMILY MEDICINE CLINIC | Age: 76
End: 2021-07-14
Payer: MEDICARE

## 2021-07-14 VITALS
OXYGEN SATURATION: 98 % | SYSTOLIC BLOOD PRESSURE: 120 MMHG | BODY MASS INDEX: 28.55 KG/M2 | WEIGHT: 188.4 LBS | TEMPERATURE: 97.1 F | DIASTOLIC BLOOD PRESSURE: 70 MMHG | HEART RATE: 78 BPM | HEIGHT: 68 IN

## 2021-07-14 DIAGNOSIS — E78.5 HYPERLIPIDEMIA, UNSPECIFIED HYPERLIPIDEMIA TYPE: Primary | ICD-10-CM

## 2021-07-14 DIAGNOSIS — I10 HYPERTENSION, UNSPECIFIED TYPE: ICD-10-CM

## 2021-07-14 DIAGNOSIS — G89.29 CHRONIC BILATERAL LOW BACK PAIN WITH SCIATICA, SCIATICA LATERALITY UNSPECIFIED: ICD-10-CM

## 2021-07-14 DIAGNOSIS — M79.18 CHRONIC MUSCULOSKELETAL PAIN: ICD-10-CM

## 2021-07-14 DIAGNOSIS — M54.40 CHRONIC BILATERAL LOW BACK PAIN WITH SCIATICA, SCIATICA LATERALITY UNSPECIFIED: ICD-10-CM

## 2021-07-14 DIAGNOSIS — G89.29 CHRONIC MUSCULOSKELETAL PAIN: ICD-10-CM

## 2021-07-14 DIAGNOSIS — K21.9 GASTROESOPHAGEAL REFLUX DISEASE WITHOUT ESOPHAGITIS: ICD-10-CM

## 2021-07-14 PROCEDURE — 99213 OFFICE O/P EST LOW 20 MIN: CPT | Performed by: FAMILY MEDICINE

## 2021-07-14 ASSESSMENT — PATIENT HEALTH QUESTIONNAIRE - PHQ9
1. LITTLE INTEREST OR PLEASURE IN DOING THINGS: 0
SUM OF ALL RESPONSES TO PHQ QUESTIONS 1-9: 0
2. FEELING DOWN, DEPRESSED OR HOPELESS: 0
SUM OF ALL RESPONSES TO PHQ9 QUESTIONS 1 & 2: 0

## 2021-07-14 NOTE — PROGRESS NOTES
Chief Complaint   Patient presents with    6 Month Follow-Up     arthritis , hands feel very stiff in morning, neck and back very stiff.  Ibuprofen and tylenol togethert takes edge off     Bijan Pritchard is a 70 y.o. male    Sleeps ok with tylenol and ibuprofen     Ongoing pain complaints  Was previously with pain mgmt and on opiates    Had previous surgery on his left arm  Having pain/locks up when he reaches behind    Issues with gabapentin and lyrica in past (intolerance)        Patient Active Problem List   Diagnosis    GERD (gastroesophageal reflux disease)    Hyperlipidemia    Hypertension    Chronic nasal congestion    Prostatitis    Low back pain    Hand pain    High risk medication use - OARRS PM&R 5/9/17, 07/12/17 OARRS PM&R, 01/24/17 Med Contract PM&R, 07/13/17 Urine Drug Screen: negative PM&R    DDD (degenerative disc disease), lumbar    Myalgia    Sciatic pain    Greater trochanteric bursitis    C7 radiculopathy    Bilateral carpal tunnel syndrome    Ulnar neuropathy of left upper extremity    Lateral epicondylitis (tennis elbow)    Cervical radiculopathy at C8    Displacement of cervical intervertebral disc without myelopathy    Displacement of lumbar intervertebral disc without myelopathy    Primary localized osteoarthrosis, other specified sites    Patient overweight    Lumbosacral spondylosis without myelopathy    Cervical spondylosis without myelopathy    Excess weight    Anxiety    Tardy ulnar nerve palsy    Actinic keratoses    RLS (restless legs syndrome)    Herpes labialis    Pneumonia    Chronic anemia       Current Outpatient Medications   Medication Sig Dispense Refill    olmesartan (BENICAR) 40 MG tablet Take 1 tablet by mouth daily 90 tablet 1    atorvastatin (LIPITOR) 20 MG tablet TAKE 1 TABLET DAILY 90 tablet 3    omeprazole (PRILOSEC) 40 MG delayed release capsule TAKE 1 CAPSULE DAILY 90 capsule 3    aspirin 325 MG EC tablet Take 1 tablet by mouth daily 30 tablet 3    Multiple Vitamin (MULTI-DAY) TABS Take by mouth      vitamin C (ASCORBIC ACID) 500 MG tablet Take 500 mg by mouth daily      vitamin D (CHOLECALCIFEROL) 1000 UNIT TABS tablet Take 1,000 Units by mouth daily      vitamin E 1000 units capsule Take 1,000 Units by mouth daily      KRILL OIL PO Take 750 mg by mouth daily       Coenzyme Q10 (COQ10 PO) Take 100 mg by mouth daily       calcium carbonate (OSCAL) 500 MG TABS tablet Take 500 mg by mouth daily. No current facility-administered medications for this visit. Patient's medications, allergies, past medical, surgical, social and family histories were reviewed and updated as appropriate. Review of Systems:   General ROS: negative for - chills, fatigue, fever, malaise, weight gain or weight loss  Respiratory ROS: no cough, shortness of breath, or wheezing  Cardiovascular ROS: no chest pain or dyspnea on exertion  Gastrointestinal ROS: no abdominal pain, change in bowel habits, or black or bloody stools  Genito-Urinary ROS: no dysuria, trouble voiding  Musculoskeletal ROS: per HPI    In general patient otherwise reports feeling well.      Physical Exam:  /70 (Site: Left Upper Arm, Position: Sitting, Cuff Size: Medium Adult)   Pulse 78   Temp 97.1 °F (36.2 °C) (Tympanic)   Ht 5' 8\" (1.727 m)   Wt 188 lb 6.4 oz (85.5 kg)   SpO2 98%   BMI 28.65 kg/m²     Gen: Well, NAD, Alert, Oriented x 3   HEENT: EOMI, eyes clear, MMM  Skin: no rash or jaundice  Neck: no significant lymphadenopathy or thyromegaly  Lungs: CTA B w/out Rales/Wheezes/Rhonchi, Good respiratory effort   Heart: RRR, S1S2, w/out M/R/G, non-displaced PMI   Psych: reports dysthymic     Lab Results   Component Value Date    WBC 7.6 12/02/2020    HGB 13.9 (L) 12/02/2020    HCT 40.1 (L) 12/02/2020     12/02/2020    CHOL 160 12/02/2020    TRIG 84 12/02/2020    HDL 62 (H) 12/02/2020    ALT 16 12/02/2020    AST 15 12/02/2020     12/02/2020    K 4.1 12/02/2020     12/02/2020    CREATININE 0.96 12/02/2020    BUN 18 12/02/2020    CO2 23 12/02/2020    TSH 0.761 03/18/2019    PSA 0.83 12/02/2020    INR 1.15 (H) 07/17/2018    LABA1C 5.5 03/28/2019         A&P   Diagnosis Orders   1. Hyperlipidemia, unspecified hyperlipidemia type     2. Gastroesophageal reflux disease without esophagitis     3. Chronic musculoskeletal pain     4. Hypertension, unspecified type     5.  Chronic bilateral low back pain with sciatica, sciatica laterality unspecified           Has tried multiple medications and is unable to tolerate side effects     Motrin/tylenol combo is part of his daily routine for now    Continue current regimen     Labs next time         Woodard Krabbe, MD

## 2021-08-10 ENCOUNTER — TELEPHONE (OUTPATIENT)
Dept: PRIMARY CARE CLINIC | Age: 76
End: 2021-08-10

## 2021-11-03 RX ORDER — OLMESARTAN MEDOXOMIL 40 MG/1
40 TABLET ORAL DAILY
Qty: 90 TABLET | Refills: 1 | Status: SHIPPED | OUTPATIENT
Start: 2021-11-03 | End: 2022-05-05

## 2021-11-03 NOTE — TELEPHONE ENCOUNTER
Future Appointments     Provider Department   1/17/2022 Darell Almeida, [address] [facility]   Appt Notes: 6 month follow up   Recent Visits    07/14/2021 Hyperlipidemia, unspecified hyperlipidemia type   Inna Sanchez MD     Last fill 04/27/21

## 2021-11-04 RX ORDER — OLMESARTAN MEDOXOMIL 40 MG/1
40 TABLET ORAL DAILY
Qty: 90 TABLET | Refills: 1 | OUTPATIENT
Start: 2021-11-04

## 2022-01-21 DIAGNOSIS — E78.5 HYPERLIPIDEMIA, UNSPECIFIED HYPERLIPIDEMIA TYPE: ICD-10-CM

## 2022-01-21 RX ORDER — ATORVASTATIN CALCIUM 20 MG/1
TABLET, FILM COATED ORAL
Qty: 90 TABLET | Refills: 3 | Status: SHIPPED | OUTPATIENT
Start: 2022-01-21

## 2022-01-24 ENCOUNTER — OFFICE VISIT (OUTPATIENT)
Dept: FAMILY MEDICINE CLINIC | Age: 77
End: 2022-01-24
Payer: MEDICARE

## 2022-01-24 VITALS
DIASTOLIC BLOOD PRESSURE: 80 MMHG | SYSTOLIC BLOOD PRESSURE: 128 MMHG | HEIGHT: 68 IN | WEIGHT: 188.8 LBS | BODY MASS INDEX: 28.61 KG/M2 | OXYGEN SATURATION: 97 % | TEMPERATURE: 96.5 F | HEART RATE: 66 BPM

## 2022-01-24 DIAGNOSIS — I10 HYPERTENSION, UNSPECIFIED TYPE: Primary | ICD-10-CM

## 2022-01-24 DIAGNOSIS — Z12.5 SCREENING PSA (PROSTATE SPECIFIC ANTIGEN): ICD-10-CM

## 2022-01-24 DIAGNOSIS — G89.29 CHRONIC BILATERAL LOW BACK PAIN WITH SCIATICA, SCIATICA LATERALITY UNSPECIFIED: ICD-10-CM

## 2022-01-24 DIAGNOSIS — E78.5 HYPERLIPIDEMIA, UNSPECIFIED HYPERLIPIDEMIA TYPE: ICD-10-CM

## 2022-01-24 DIAGNOSIS — N52.9 ERECTILE DYSFUNCTION, UNSPECIFIED ERECTILE DYSFUNCTION TYPE: ICD-10-CM

## 2022-01-24 DIAGNOSIS — G89.29 CHRONIC MUSCULOSKELETAL PAIN: ICD-10-CM

## 2022-01-24 DIAGNOSIS — M79.18 CHRONIC MUSCULOSKELETAL PAIN: ICD-10-CM

## 2022-01-24 DIAGNOSIS — K21.9 GASTROESOPHAGEAL REFLUX DISEASE WITHOUT ESOPHAGITIS: ICD-10-CM

## 2022-01-24 DIAGNOSIS — M54.40 CHRONIC BILATERAL LOW BACK PAIN WITH SCIATICA, SCIATICA LATERALITY UNSPECIFIED: ICD-10-CM

## 2022-01-24 DIAGNOSIS — I10 HYPERTENSION, UNSPECIFIED TYPE: ICD-10-CM

## 2022-01-24 LAB
ALBUMIN SERPL-MCNC: 4.7 G/DL (ref 3.5–4.6)
ALP BLD-CCNC: 51 U/L (ref 35–104)
ALT SERPL-CCNC: 15 U/L (ref 0–41)
ANION GAP SERPL CALCULATED.3IONS-SCNC: 11 MEQ/L (ref 9–15)
AST SERPL-CCNC: 15 U/L (ref 0–40)
BILIRUB SERPL-MCNC: 0.5 MG/DL (ref 0.2–0.7)
BUN BLDV-MCNC: 15 MG/DL (ref 8–23)
CALCIUM SERPL-MCNC: 10 MG/DL (ref 8.5–9.9)
CHLORIDE BLD-SCNC: 104 MEQ/L (ref 95–107)
CHOLESTEROL, TOTAL: 161 MG/DL (ref 0–199)
CO2: 28 MEQ/L (ref 20–31)
CREAT SERPL-MCNC: 0.9 MG/DL (ref 0.7–1.2)
GFR AFRICAN AMERICAN: >60
GFR NON-AFRICAN AMERICAN: >60
GLOBULIN: 3.1 G/DL (ref 2.3–3.5)
GLUCOSE BLD-MCNC: 95 MG/DL (ref 70–99)
HCT VFR BLD CALC: 43.1 % (ref 42–52)
HDLC SERPL-MCNC: 59 MG/DL (ref 40–59)
HEMOGLOBIN: 14.2 G/DL (ref 14–18)
LDL CHOLESTEROL CALCULATED: 78 MG/DL (ref 0–129)
MCH RBC QN AUTO: 30.6 PG (ref 27–31.3)
MCHC RBC AUTO-ENTMCNC: 33 % (ref 33–37)
MCV RBC AUTO: 92.8 FL (ref 80–100)
PDW BLD-RTO: 13.8 % (ref 11.5–14.5)
PLATELET # BLD: 209 K/UL (ref 130–400)
POTASSIUM SERPL-SCNC: 4.3 MEQ/L (ref 3.4–4.9)
PROSTATE SPECIFIC ANTIGEN: 0.79 NG/ML (ref 0–4)
RBC # BLD: 4.64 M/UL (ref 4.7–6.1)
SODIUM BLD-SCNC: 143 MEQ/L (ref 135–144)
TOTAL PROTEIN: 7.8 G/DL (ref 6.3–8)
TRIGL SERPL-MCNC: 118 MG/DL (ref 0–150)
WBC # BLD: 7.6 K/UL (ref 4.8–10.8)

## 2022-01-24 PROCEDURE — 99214 OFFICE O/P EST MOD 30 MIN: CPT | Performed by: FAMILY MEDICINE

## 2022-01-24 PROCEDURE — 3288F FALL RISK ASSESSMENT DOCD: CPT | Performed by: FAMILY MEDICINE

## 2022-01-24 RX ORDER — SILDENAFIL 100 MG/1
100 TABLET, FILM COATED ORAL PRN
Qty: 30 TABLET | Refills: 5 | Status: SHIPPED | OUTPATIENT
Start: 2022-01-24 | End: 2022-07-26

## 2022-01-24 SDOH — ECONOMIC STABILITY: FOOD INSECURITY: WITHIN THE PAST 12 MONTHS, YOU WORRIED THAT YOUR FOOD WOULD RUN OUT BEFORE YOU GOT MONEY TO BUY MORE.: NEVER TRUE

## 2022-01-24 SDOH — ECONOMIC STABILITY: FOOD INSECURITY: WITHIN THE PAST 12 MONTHS, THE FOOD YOU BOUGHT JUST DIDN'T LAST AND YOU DIDN'T HAVE MONEY TO GET MORE.: NEVER TRUE

## 2022-01-24 ASSESSMENT — SOCIAL DETERMINANTS OF HEALTH (SDOH): HOW HARD IS IT FOR YOU TO PAY FOR THE VERY BASICS LIKE FOOD, HOUSING, MEDICAL CARE, AND HEATING?: NOT HARD AT ALL

## 2022-01-24 NOTE — PROGRESS NOTES
Chief Complaint   Patient presents with    Hyperlipidemia     6 month     Discuss Medications     christiane Brown is a 78 y.o. male    hyperlipid  Ok on lipitor    Sleeps ok with tylenol and ibuprofen     Ongoing pain complaints  Was previously with pain mgmt and on opiates  Hands stiff/hurt    Arthritis pain, worse with cold weather     Had previous surgery on his left arm  Having pain/locks up when he reaches behind    Issues with gabapentin and lyrica in past (intolerance)        Patient Active Problem List   Diagnosis    GERD (gastroesophageal reflux disease)    Hyperlipidemia    Hypertension    Chronic nasal congestion    Prostatitis    Low back pain    Hand pain    High risk medication use - OARRS PM&R 5/9/17, 07/12/17 OARRS PM&R, 01/24/17 Med Contract PM&R, 07/13/17 Urine Drug Screen: negative PM&R    DDD (degenerative disc disease), lumbar    Myalgia    Sciatic pain    Greater trochanteric bursitis    C7 radiculopathy    Bilateral carpal tunnel syndrome    Ulnar neuropathy of left upper extremity    Lateral epicondylitis (tennis elbow)    Cervical radiculopathy at C8    Displacement of cervical intervertebral disc without myelopathy    Displacement of lumbar intervertebral disc without myelopathy    Primary localized osteoarthrosis, other specified sites    Patient overweight    Lumbosacral spondylosis without myelopathy    Cervical spondylosis without myelopathy    Excess weight    Anxiety    Tardy ulnar nerve palsy    Actinic keratoses    RLS (restless legs syndrome)    Herpes labialis    Pneumonia    Chronic anemia       Current Outpatient Medications   Medication Sig Dispense Refill    atorvastatin (LIPITOR) 20 MG tablet TAKE 1 TABLET DAILY 90 tablet 3    olmesartan (BENICAR) 40 MG tablet TAKE 1 TABLET BY MOUTH DAILY 90 tablet 1    omeprazole (PRILOSEC) 40 MG delayed release capsule TAKE 1 CAPSULE DAILY 90 capsule 3    aspirin 325 MG EC tablet Take 1 tablet by mouth daily 30 tablet 3    Multiple Vitamin (MULTI-DAY) TABS Take by mouth      vitamin C (ASCORBIC ACID) 500 MG tablet Take 500 mg by mouth daily      vitamin D (CHOLECALCIFEROL) 1000 UNIT TABS tablet Take 1,000 Units by mouth daily      vitamin E 1000 units capsule Take 1,000 Units by mouth daily      KRILL OIL PO Take 750 mg by mouth daily       Coenzyme Q10 (COQ10 PO) Take 100 mg by mouth daily       calcium carbonate (OSCAL) 500 MG TABS tablet Take 500 mg by mouth daily. No current facility-administered medications for this visit. Patient's medications, allergies, past medical, surgical, social and family histories were reviewed and updated as appropriate. Review of Systems:   General ROS: negative for - chills, fatigue, fever, malaise, weight gain or weight loss  Respiratory ROS: no cough, shortness of breath, or wheezing  Cardiovascular ROS: no chest pain or dyspnea on exertion  Gastrointestinal ROS: no abdominal pain, change in bowel habits, or black or bloody stools  Genito-Urinary ROS: no dysuria, trouble voiding  Musculoskeletal ROS: per HPI    In general patient otherwise reports feeling well.      Physical Exam:  /80 (Site: Left Upper Arm)   Pulse 66   Temp 96.5 °F (35.8 °C)   Ht 5' 8\" (1.727 m)   Wt 188 lb 12.8 oz (85.6 kg)   SpO2 97%   BMI 28.71 kg/m²     Gen: Well, NAD, Alert, Oriented x 3   HEENT: EOMI, eyes clear, MMM  Skin: no rash or jaundice  Neck: no significant lymphadenopathy or thyromegaly  Lungs: CTA B w/out Rales/Wheezes/Rhonchi, Good respiratory effort   Heart: RRR, S1S2, w/out M/R/G, non-displaced PMI   Psych: reports dysthymic     Lab Results   Component Value Date    WBC 7.6 12/02/2020    HGB 13.9 (L) 12/02/2020    HCT 40.1 (L) 12/02/2020     12/02/2020    CHOL 160 12/02/2020    TRIG 84 12/02/2020    HDL 62 (H) 12/02/2020    ALT 16 12/02/2020    AST 15 12/02/2020     12/02/2020    K 4.1 12/02/2020     12/02/2020 CREATININE 0.96 12/02/2020    BUN 18 12/02/2020    CO2 23 12/02/2020    TSH 0.761 03/18/2019    PSA 0.83 12/02/2020    INR 1.15 (H) 07/17/2018    LABA1C 5.5 03/28/2019         A&P   Diagnosis Orders   1. Screening PSA (prostate specific antigen)  PSA Screening   2. Hyperlipidemia, unspecified hyperlipidemia type     3. Gastroesophageal reflux disease without esophagitis     4. Hypertension, unspecified type  CBC    Comprehensive Metabolic Panel    Lipid Panel   5. Chronic musculoskeletal pain     6.  Chronic bilateral low back pain with sciatica, sciatica laterality unspecified           Has tried multiple medications and is unable to tolerate side effects     Motrin/tylenol combo is part of his daily routine for now    Continue current regimen     Labs next time         Milagros Sebastian MD        Chief Complaint   Patient presents with    Hyperlipidemia     6 month     Discuss Medications     christiane Rich is a 78 y.o. male    Sleeps ok with tylenol and ibuprofen     Ongoing pain complaints  Was previously with pain mgmt and on opiates    Had previous surgery on his left arm  Having pain/locks up when he reaches behind    Issues with gabapentin and lyrica in past (intolerance)        Patient Active Problem List   Diagnosis    GERD (gastroesophageal reflux disease)    Hyperlipidemia    Hypertension    Chronic nasal congestion    Prostatitis    Low back pain    Hand pain    High risk medication use - OARRS PM&R 5/9/17, 07/12/17 OARRS PM&R, 01/24/17 Med Contract PM&R, 07/13/17 Urine Drug Screen: negative PM&R    DDD (degenerative disc disease), lumbar    Myalgia    Sciatic pain    Greater trochanteric bursitis    C7 radiculopathy    Bilateral carpal tunnel syndrome    Ulnar neuropathy of left upper extremity    Lateral epicondylitis (tennis elbow)    Cervical radiculopathy at C8    Displacement of cervical intervertebral disc without myelopathy    Displacement of lumbar intervertebral disc without myelopathy    Primary localized osteoarthrosis, other specified sites    Patient overweight    Lumbosacral spondylosis without myelopathy    Cervical spondylosis without myelopathy    Excess weight    Anxiety    Tardy ulnar nerve palsy    Actinic keratoses    RLS (restless legs syndrome)    Herpes labialis    Pneumonia    Chronic anemia       Current Outpatient Medications   Medication Sig Dispense Refill    atorvastatin (LIPITOR) 20 MG tablet TAKE 1 TABLET DAILY 90 tablet 3    olmesartan (BENICAR) 40 MG tablet TAKE 1 TABLET BY MOUTH DAILY 90 tablet 1    omeprazole (PRILOSEC) 40 MG delayed release capsule TAKE 1 CAPSULE DAILY 90 capsule 3    aspirin 325 MG EC tablet Take 1 tablet by mouth daily 30 tablet 3    Multiple Vitamin (MULTI-DAY) TABS Take by mouth      vitamin C (ASCORBIC ACID) 500 MG tablet Take 500 mg by mouth daily      vitamin D (CHOLECALCIFEROL) 1000 UNIT TABS tablet Take 1,000 Units by mouth daily      vitamin E 1000 units capsule Take 1,000 Units by mouth daily      KRILL OIL PO Take 750 mg by mouth daily       Coenzyme Q10 (COQ10 PO) Take 100 mg by mouth daily       calcium carbonate (OSCAL) 500 MG TABS tablet Take 500 mg by mouth daily. No current facility-administered medications for this visit. Patient's medications, allergies, past medical, surgical, social and family histories were reviewed and updated as appropriate. Review of Systems:   General ROS: negative for - chills, fatigue, fever, malaise, weight gain or weight loss  Respiratory ROS: no cough, shortness of breath, or wheezing  Cardiovascular ROS: no chest pain or dyspnea on exertion  Gastrointestinal ROS: no abdominal pain, change in bowel habits, or black or bloody stools  Genito-Urinary ROS: no dysuria, trouble voiding  Musculoskeletal ROS: per HPI    In general patient otherwise reports feeling well.      Physical Exam:  /80 (Site: Left Upper Arm)   Pulse 66   Temp 96.5 °F

## 2022-01-26 ENCOUNTER — TELEPHONE (OUTPATIENT)
Dept: FAMILY MEDICINE CLINIC | Age: 77
End: 2022-01-26

## 2022-01-26 DIAGNOSIS — K21.9 GASTROESOPHAGEAL REFLUX DISEASE WITHOUT ESOPHAGITIS: ICD-10-CM

## 2022-01-26 RX ORDER — OMEPRAZOLE 40 MG/1
CAPSULE, DELAYED RELEASE ORAL
Qty: 90 CAPSULE | Refills: 3 | Status: SHIPPED | OUTPATIENT
Start: 2022-01-26

## 2022-01-26 NOTE — TELEPHONE ENCOUNTER
Requesting medication refill. Please approve or deny this request.    Rx requested:  Requested Prescriptions     Pending Prescriptions Disp Refills    omeprazole (PRILOSEC) 40 MG delayed release capsule [Pharmacy Med Name: OMEPRAZOLE DR CAPS 40MG] 90 capsule 3     Sig: TAKE 1 CAPSULE DAILY       Last Office Visit:   1/24/2022    Next Visit Date:  No future appointments.

## 2022-01-26 NOTE — TELEPHONE ENCOUNTER
Pt states that the viagra was not covered by ins at pharmacy. PA possibly needed? Can this be looked into for patient.

## 2022-02-16 ENCOUNTER — TELEPHONE (OUTPATIENT)
Dept: FAMILY MEDICINE CLINIC | Age: 77
End: 2022-02-16

## 2022-04-18 ENCOUNTER — TELEPHONE (OUTPATIENT)
Dept: FAMILY MEDICINE CLINIC | Age: 77
End: 2022-04-18

## 2022-04-18 DIAGNOSIS — M19.90 ARTHRITIS: Primary | ICD-10-CM

## 2022-04-18 NOTE — TELEPHONE ENCOUNTER
----- Message from Sukhwinder Aguillon sent at 4/18/2022  2:37 PM EDT -----  Subject: Referral Request    QUESTIONS   Reason for referral request? Patient says arthritis in neck, hands, and   back. Wants referral to Rheumatologist   Has the physician seen you for this condition before? No   Preferred Specialist (if applicable)? Do you already have an appointment scheduled? No  Additional Information for Provider?   ---------------------------------------------------------------------------  --------------  CALL BACK INFO  What is the best way for the office to contact you? OK to leave message on   voicemail  Preferred Call Back Phone Number? [phone]  ---------------------------------------------------------------------------  --------------  SCRIPT ANSWERS  Relationship to Patient?  Self

## 2022-05-05 RX ORDER — OLMESARTAN MEDOXOMIL 40 MG/1
40 TABLET ORAL DAILY
Qty: 90 TABLET | Refills: 0 | Status: SHIPPED | OUTPATIENT
Start: 2022-05-05 | End: 2022-08-16 | Stop reason: SDUPTHER

## 2022-05-05 NOTE — TELEPHONE ENCOUNTER
Requesting medication refill.  Please approve or deny this request.    Rx requested:  Requested Prescriptions     Pending Prescriptions Disp Refills    olmesartan (BENICAR) 40 MG tablet [Pharmacy Med Name: OLMESARTAN MEDOXOMIL 40MG TABLETS] 90 tablet 1     Sig: TAKE 1 TABLET BY MOUTH DAILY       Last Office Visit:   1/24/2022    Next Visit Date:  Future Appointments   Date Time Provider Sánchez Rivera   5/19/2022 10:00 AM Abhishek Rodriguez MD [address] [facility].     Due 7/24/22  Pt scheduled 7/6/22

## 2022-05-19 ENCOUNTER — TELEPHONE (OUTPATIENT)
Dept: PAIN MANAGEMENT | Age: 77
End: 2022-05-19

## 2022-05-19 ENCOUNTER — OFFICE VISIT (OUTPATIENT)
Dept: PAIN MANAGEMENT | Age: 77
End: 2022-05-19
Payer: MEDICARE

## 2022-05-19 VITALS
TEMPERATURE: 98.2 F | DIASTOLIC BLOOD PRESSURE: 74 MMHG | WEIGHT: 180 LBS | SYSTOLIC BLOOD PRESSURE: 136 MMHG | BODY MASS INDEX: 27.28 KG/M2 | HEIGHT: 68 IN

## 2022-05-19 DIAGNOSIS — M79.641 BILATERAL HAND PAIN: ICD-10-CM

## 2022-05-19 DIAGNOSIS — M79.642 BILATERAL HAND PAIN: ICD-10-CM

## 2022-05-19 DIAGNOSIS — M47.817 LUMBOSACRAL SPONDYLOSIS WITHOUT MYELOPATHY: ICD-10-CM

## 2022-05-19 DIAGNOSIS — M47.812 CERVICAL SPONDYLOSIS WITHOUT MYELOPATHY: Primary | ICD-10-CM

## 2022-05-19 DIAGNOSIS — G56.03 BILATERAL CARPAL TUNNEL SYNDROME: ICD-10-CM

## 2022-05-19 PROCEDURE — 99204 OFFICE O/P NEW MOD 45 MIN: CPT | Performed by: PHYSICAL MEDICINE & REHABILITATION

## 2022-05-19 RX ORDER — LIDOCAINE 40 MG/G
CREAM TOPICAL
Qty: 45 G | Refills: 1 | Status: SHIPPED | OUTPATIENT
Start: 2022-05-19 | End: 2022-07-29 | Stop reason: CLARIF

## 2022-05-19 RX ORDER — TIZANIDINE 2 MG/1
2 TABLET ORAL EVERY EVENING
Qty: 30 TABLET | Refills: 0 | Status: SHIPPED | OUTPATIENT
Start: 2022-05-19 | End: 2022-07-29

## 2022-05-19 ASSESSMENT — ENCOUNTER SYMPTOMS
NAUSEA: 0
DIARRHEA: 0
SHORTNESS OF BREATH: 0
CONSTIPATION: 0
BACK PAIN: 1

## 2022-05-19 NOTE — TELEPHONE ENCOUNTER
IVELISSEAT CARPAL TUNNEL INJECTION UNDER XR    NO AUTH REQUIRED    OK to schedule procedure approved as above. Please note sides/levels approved and date range. (If applicable, sides/levels approved may differ from those ordered)    TO BE SCHEDULED WITH DR. David Ramirez

## 2022-05-19 NOTE — TELEPHONE ENCOUNTER
ORDER PLACED:    Date: 5/19/22  Description: BILAT CARPAL TUNNEL INJECTION UNDER XR  Order Number: [id number]  Ordering Provider: [facility]  Performing Provider: Riccardo Umnaa  CPT Codes: 08048  ICD10 Codes: A54.46

## 2022-05-19 NOTE — TELEPHONE ENCOUNTER
ORDER PLACED:    Date: 5/19/22  Description: BILAT CARPAL TUNNEL INJECTION  Order Number: [id number]  Ordering Provider: [facility]  Performing Provider: [facility]  CPT Codes:   ICD10 Codes: Q72.99

## 2022-05-19 NOTE — TELEPHONE ENCOUNTER
BENEFITS:    Insurance[facility]ila Syed [facility]  Phone: [phone]  Contact Name: Jodee Garcia  Effective Date: 1/1/22     Plan year: CAL YEAR  Deductible: N/A      Deductible Met: N/A  Allowed/benefits paid at: 100%  OOP: N/A  Freq Limits: R Han 14  Prior Auth Requirement: NO AUTH REQUIRED    Notes:     Call Reference #: [id number]    Time of call: 1:03PM

## 2022-05-19 NOTE — PROGRESS NOTES
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 of not pursing these recommendations were emphasized. The patient was offered a treatment at our facility. The physician and patient have discussed in detail the risk of exposure to and/or potential harm posed by the COVID-19 virus with having office visits and procedures at this time versus the risk of delaying the visits and procedures. It is not possible to know either the risk of delaying the visits or procedure or chance of getting an infection with perfect accuracy, but a joint decision was made between the patient and the physician to proceed at this time with the scheduled visits and procedures. Advised him that any lab testing, imaging, or other diagnostic test results are best discussed in person in the office so that we can provide a clear explanation of their significance and best treatment based upon these results. It is his responsibility to make and keep a follow up appointment to discuss these test results in person to discuss the significance of the findings and appropriate follow-up steps. He expressed complete understanding and agreement with the entire plan as outlined above. Portions of this note may have been typed, auto-populated, dictated or transcribed by voice recognition resulting in errors, omissions, or close substitutions which may be missed despite careful proofreading. Please contact the author for any questions or concerns. Thank you Dr. Enrique Calvert for the opportunity to participate in this patient's care. If you have any questions or concerns, please do not hesitate to contact us. Follow up:  Return in about 1 month (around 6/19/2022) for reassessment of pain and symptoms, EMG Internal, P.T. Internal Ref.     Concepcion Rice MD

## 2022-05-19 NOTE — TELEPHONE ENCOUNTER
ORDER PLACED:    Date: 5/19/22  Description: Elisha SHARIF  Order Number: [id number]  Ordering Provider: [facility]  Performing Provider:   CPT Codes: 83646,09831  ICD10 Codes: M47.817    PER CONSULT NOTE IT STATES BILAT L3,4,5 MBB

## 2022-05-20 ENCOUNTER — TELEPHONE (OUTPATIENT)
Dept: PAIN MANAGEMENT | Age: 77
End: 2022-05-20

## 2022-05-20 NOTE — TELEPHONE ENCOUNTER
ORDER PLACED:    Date: 5/19/22  Description: Cora SHARIF  Order Number: [id number]  Ordering Provider: [facility]  Performing Provider: [facility]  CPT Codes: 14777,20183  ICD10 Codes: L89.335

## 2022-05-25 ENCOUNTER — TELEPHONE (OUTPATIENT)
Dept: PAIN MANAGEMENT | Age: 77
End: 2022-05-25

## 2022-05-25 NOTE — TELEPHONE ENCOUNTER
BENEFITS: [facility]ILATER [facility] EMG    Insurance: [facility]  Phone: [phone]  Contact Name: Terell Kenney  Effective Date: 1.1.2022     Plan year: YES-CALENDAR  Deductible: 200.00      Deductible Met: 0.00  Allowed/benefits paid at: 100% AFTER DEDUCTIBLE  OOP: 1500.00 MET $50.00  Freq Limits: 41281 & 95886--BASED ON MEDICAL NECESSITY  Prior Auth Requirement: NO AUTH REQUIRED    Notes: NO PRE-EX CLAUSE    Call Reference #: [id number]    Time of call: 2:35PM

## 2022-05-25 NOTE — TELEPHONE ENCOUNTER
PT WANTS TO HOLD OFF AT THIS TIME HE DOES NOT HAVE TIME RIGHT NOW TO HAVE THIS APPT WILL C/B WHEN HE DECIDES TO HAVE THIS DONE

## 2022-05-27 ENCOUNTER — COMMUNITY OUTREACH (OUTPATIENT)
Dept: FAMILY MEDICINE CLINIC | Age: 77
End: 2022-05-27

## 2022-06-01 NOTE — TELEPHONE ENCOUNTER
PLEASE CONTACT PATIENT TO SEE IF CERVICAL AND LUMBAR IMAGING HAS BEEN DONE    WAITING ON THIS TO PROCEED WITH HIS AUTHORIZATIONS

## 2022-06-02 NOTE — TELEPHONE ENCOUNTER
PT WAS CALLED AND HAS NOT HAD CERVICAL OR LUMBAR X-RAYS DONE. PT REPORTS HE WAS IN [place] AND WILL BE GOING BACK HERE SOON. PT STATES HE WILL COME IN NEXT WEEK AND GO TO [facility] TO GET X-RAYS DONE.

## 2022-06-15 DIAGNOSIS — M47.812 CERVICAL SPONDYLOSIS WITHOUT MYELOPATHY: ICD-10-CM

## 2022-06-15 DIAGNOSIS — M47.817 LUMBOSACRAL SPONDYLOSIS WITHOUT MYELOPATHY: ICD-10-CM

## 2022-06-15 RX ORDER — TIZANIDINE 2 MG/1
TABLET ORAL
Qty: 30 TABLET | Refills: 0 | OUTPATIENT
Start: 2022-06-15

## 2022-07-07 ENCOUNTER — TELEPHONE (OUTPATIENT)
Dept: FAMILY MEDICINE CLINIC | Age: 77
End: 2022-07-07

## 2022-07-26 ENCOUNTER — OFFICE VISIT (OUTPATIENT)
Dept: FAMILY MEDICINE CLINIC | Age: 77
End: 2022-07-26
Payer: MEDICARE

## 2022-07-26 ENCOUNTER — TELEPHONE (OUTPATIENT)
Dept: PAIN MANAGEMENT | Age: 77
End: 2022-07-26

## 2022-07-26 VITALS
DIASTOLIC BLOOD PRESSURE: 64 MMHG | BODY MASS INDEX: 27.74 KG/M2 | TEMPERATURE: 97.6 F | HEIGHT: 68 IN | WEIGHT: 183 LBS | SYSTOLIC BLOOD PRESSURE: 126 MMHG | HEART RATE: 66 BPM | OXYGEN SATURATION: 96 %

## 2022-07-26 DIAGNOSIS — M79.18 CHRONIC MUSCULOSKELETAL PAIN: ICD-10-CM

## 2022-07-26 DIAGNOSIS — K21.9 GASTROESOPHAGEAL REFLUX DISEASE WITHOUT ESOPHAGITIS: ICD-10-CM

## 2022-07-26 DIAGNOSIS — M19.90 ARTHRITIS: Primary | ICD-10-CM

## 2022-07-26 DIAGNOSIS — G89.29 CHRONIC MUSCULOSKELETAL PAIN: ICD-10-CM

## 2022-07-26 DIAGNOSIS — E78.5 HYPERLIPIDEMIA, UNSPECIFIED HYPERLIPIDEMIA TYPE: ICD-10-CM

## 2022-07-26 DIAGNOSIS — I10 HYPERTENSION, UNSPECIFIED TYPE: ICD-10-CM

## 2022-07-26 PROCEDURE — 99213 OFFICE O/P EST LOW 20 MIN: CPT | Performed by: FAMILY MEDICINE

## 2022-07-26 PROCEDURE — 1123F ACP DISCUSS/DSCN MKR DOCD: CPT | Performed by: FAMILY MEDICINE

## 2022-07-26 ASSESSMENT — PATIENT HEALTH QUESTIONNAIRE - PHQ9
SUM OF ALL RESPONSES TO PHQ9 QUESTIONS 1 & 2: 0
1. LITTLE INTEREST OR PLEASURE IN DOING THINGS: 0
SUM OF ALL RESPONSES TO PHQ QUESTIONS 1-9: 0
2. FEELING DOWN, DEPRESSED OR HOPELESS: 0
SUM OF ALL RESPONSES TO PHQ QUESTIONS 1-9: 0

## 2022-07-26 NOTE — TELEPHONE ENCOUNTER
PT. CAME INTO THE OFFICE THIS AFTERNOON WITH XRAY ORDERS. HE IS HAVING THEM DONE TODAY. PT. IS READY TO HAVE INJECTIONS DONE. CAN WE PROCEED WITH GETTING THE PRIOR AUTHS?

## 2022-07-26 NOTE — PROGRESS NOTES
Chief Complaint   Patient presents with    Hyperlipidemia     6 month      Sandra Alvarado is a 78 y.o. male    hyperlipid  Brian Pale on [employer] a lot  Multiple grandchildren graduating  Had to help daughter sell home     Sleeps ok with tylenol and ibuprofen     Ongoing pain complaints  Was previously with pain mgmt and on opiates  Hands stiff/hurt    Arthritis pain, worse with cold weather     Had previous surgery on his left arm  Having pain/locks up when he reaches behind    Issues with gabapentin and lyrica in past (intolerance)        Patient Active Problem List   Diagnosis    GERD (gastroesophageal reflux disease)    Hyperlipidemia    Hypertension    Chronic nasal congestion    Prostatitis    Low back pain    Hand pain    High risk medication use - OARRS PM&R 5/9/17, 07/12/17 OARRS PM&R, 01/24/17 Med Contract PM&R, 07/13/17 Urine Drug Screen: negative PM&R    DDD (degenerative disc disease), lumbar    Myalgia    Sciatic pain    Greater trochanteric bursitis    C7 radiculopathy    Bilateral carpal tunnel syndrome    Ulnar neuropathy of left upper extremity    Lateral epicondylitis (tennis elbow)    Cervical radiculopathy at C8    Displacement of cervical intervertebral disc without myelopathy    Displacement of lumbar intervertebral disc without myelopathy    Primary localized osteoarthrosis, other specified sites    Patient overweight    Lumbosacral spondylosis without myelopathy    Cervical spondylosis without myelopathy    Excess weight    Anxiety    Tardy ulnar nerve palsy    Actinic keratoses    RLS (restless legs syndrome)    Herpes labialis    Pneumonia    Chronic anemia       Current Outpatient Medications   Medication Sig Dispense Refill    lidocaine (LMX) 4 % cream Apply a half dollar sized amount to intact skin topically up to twice daily as needed for pain 45 g 1    olmesartan (BENICAR) 40 MG tablet TAKE 1 TABLET BY MOUTH DAILY 90 tablet 0    omeprazole (PRILOSEC) 40 MG delayed release 01/24/2022    HCT 43.1 01/24/2022     01/24/2022    CHOL 161 01/24/2022    TRIG 118 01/24/2022    HDL 59 01/24/2022    ALT 15 01/24/2022    AST 15 01/24/2022     01/24/2022    K 4.3 01/24/2022     01/24/2022    CREATININE 0.90 01/24/2022    BUN 15 01/24/2022    CO2 28 01/24/2022    TSH 0.761 03/18/2019    PSA 0.79 01/24/2022    INR 1.15 (H) 07/17/2018    LABA1C 5.5 03/28/2019         A&P   Diagnosis Orders   1. Arthritis        2. Hypertension, unspecified type        3. Gastroesophageal reflux disease without esophagitis        4. Hyperlipidemia, unspecified hyperlipidemia type        5.  Chronic musculoskeletal pain                Has tried multiple medications and is unable to tolerate side effects     Motrin/tylenol combo is part of his daily routine for now    Continue current regimen     Labs next time         Lexi Clark MD        Chief Complaint   Patient presents with    Hyperlipidemia     6 month      Shanika Bear is a 78 y.o. male    Sleeps ok with tylenol and ibuprofen     Ongoing pain complaints  Was previously with pain mgmt and on opiates    Had previous surgery on his left arm  Having pain/locks up when he reaches behind    Issues with gabapentin and lyrica in past (intolerance)        Patient Active Problem List   Diagnosis    GERD (gastroesophageal reflux disease)    Hyperlipidemia    Hypertension    Chronic nasal congestion    Prostatitis    Low back pain    Hand pain    High risk medication use - OARRS PM&R 5/9/17, 07/12/17 OARRS PM&R, 01/24/17 Med Contract PM&R, 07/13/17 Urine Drug Screen: negative PM&R    DDD (degenerative disc disease), lumbar    Myalgia    Sciatic pain    Greater trochanteric bursitis    C7 radiculopathy    Bilateral carpal tunnel syndrome    Ulnar neuropathy of left upper extremity    Lateral epicondylitis (tennis elbow)    Cervical radiculopathy at C8    Displacement of cervical intervertebral disc without myelopathy    Displacement of lumbar intervertebral disc without myelopathy    Primary localized osteoarthrosis, other specified sites    Patient overweight    Lumbosacral spondylosis without myelopathy    Cervical spondylosis without myelopathy    Excess weight    Anxiety    Tardy ulnar nerve palsy    Actinic keratoses    RLS (restless legs syndrome)    Herpes labialis    Pneumonia    Chronic anemia       Current Outpatient Medications   Medication Sig Dispense Refill    lidocaine (LMX) 4 % cream Apply a half dollar sized amount to intact skin topically up to twice daily as needed for pain 45 g 1    olmesartan (BENICAR) 40 MG tablet TAKE 1 TABLET BY MOUTH DAILY 90 tablet 0    omeprazole (PRILOSEC) 40 MG delayed release capsule TAKE 1 CAPSULE DAILY 90 capsule 3    atorvastatin (LIPITOR) 20 MG tablet TAKE 1 TABLET DAILY 90 tablet 3    Multiple Vitamin (MULTI-DAY) TABS Take by mouth      vitamin C (ASCORBIC ACID) 500 MG tablet Take 500 mg by mouth daily      vitamin D (CHOLECALCIFEROL) 1000 UNIT TABS tablet Take 1,000 Units by mouth daily      vitamin E 1000 units capsule Take 1,000 Units by mouth daily      KRILL OIL PO Take 750 mg by mouth daily       Coenzyme Q10 (COQ10 PO) Take 100 mg by mouth daily       calcium carbonate (OSCAL) 500 MG TABS tablet Take 500 mg by mouth daily. sildenafil (VIAGRA) 100 MG tablet Take 1 tablet by mouth as needed for Erectile Dysfunction 30 tablet 5    aspirin 325 MG EC tablet Take 1 tablet by mouth daily 30 tablet 3     No current facility-administered medications for this visit. Patient's medications, allergies, past medical, surgical, social and family histories were reviewed and updated as appropriate.     Review of Systems:   General ROS: negative for - chills, fatigue, fever, malaise, weight gain or weight loss  Respiratory ROS: no cough, shortness of breath, or wheezing  Cardiovascular ROS: no chest pain or dyspnea on exertion  Gastrointestinal ROS: no abdominal pain, change in bowel habits, or black or bloody stools  Genito-Urinary ROS: no dysuria, trouble voiding  Musculoskeletal ROS: per HPI    In general patient otherwise reports feeling well. Physical Exam:  /64 (Site: Right Upper Arm)   Pulse 66   Temp 97.6 °F (36.4 °C)   Ht 5' 8\" (1.727 m)   Wt 183 lb (83 kg)   SpO2 96%   BMI 27.83 kg/m²     Gen: Well, NAD, Alert, Oriented x 3   HEENT: EOMI, eyes clear, MMM  Skin: no rash or jaundice  Neck: no significant lymphadenopathy or thyromegaly  Lungs: CTA B w/out Rales/Wheezes/Rhonchi, Good respiratory effort   Heart: RRR, S1S2, w/out M/R/G, non-displaced PMI   Psych: reports mood ok     Lab Results   Component Value Date    WBC 7.6 01/24/2022    HGB 14.2 01/24/2022    HCT 43.1 01/24/2022     01/24/2022    CHOL 161 01/24/2022    TRIG 118 01/24/2022    HDL 59 01/24/2022    ALT 15 01/24/2022    AST 15 01/24/2022     01/24/2022    K 4.3 01/24/2022     01/24/2022    CREATININE 0.90 01/24/2022    BUN 15 01/24/2022    CO2 28 01/24/2022    TSH 0.761 03/18/2019    PSA 0.79 01/24/2022    INR 1.15 (H) 07/17/2018    LABA1C 5.5 03/28/2019         A&P   Diagnosis Orders   1. Arthritis        2. Hypertension, unspecified type        3. Gastroesophageal reflux disease without esophagitis        4. Hyperlipidemia, unspecified hyperlipidemia type        5.  Chronic musculoskeletal pain                Has tried multiple medications and is unable to tolerate side effects     Motrin/tylenol combo is part of his daily routine for now    Continue current regimen     Labs next time        Marc Ash MD

## 2022-07-27 NOTE — TELEPHONE ENCOUNTER
GHAZALA L3,4,5 MBB    AUTH APPROVED FROM 8/1/22-1/23/23    OK to schedule procedure approved as above. Please note sides/levels approved and date range. (If applicable, sides/levels approved may differ from those ordered)    TO BE SCHEDULED WITH DR. Remedios Salmeron

## 2022-07-27 NOTE — TELEPHONE ENCOUNTER
GHAZALA C5,6,7 MBB    AUTH APPROVED FROM 8/1/22-1/23/23    OK to schedule procedure approved as above. Please note sides/levels approved and date range. (If applicable, sides/levels approved may differ from those ordered)    TO BE SCHEDULED WITH DR. Safia Norman

## 2022-07-27 NOTE — TELEPHONE ENCOUNTER
AUTHORIZATION:GHAZALA L3,4,5 MBB    INSURANCE: [address] VIA: John Velez #: [id number]    DATE RANGE: 8/1/22-1/23/23    TELEPHONE CALL ROUTED TO MA TO SCHEDULE.

## 2022-07-27 NOTE — TELEPHONE ENCOUNTER
AUTHORIZATION: GHAZALA C5,6,7 MBB    INSURANCE: [employer] [facility] VIA: [address] #: [id number]    DATE RANGE: 8/1/22-1/23/23    TELEPHONE CALL ROUTED TO MA TO SCHEDULE.

## 2022-07-28 DIAGNOSIS — M47.812 CERVICAL SPONDYLOSIS WITHOUT MYELOPATHY: ICD-10-CM

## 2022-07-28 DIAGNOSIS — M47.817 LUMBOSACRAL SPONDYLOSIS WITHOUT MYELOPATHY: ICD-10-CM

## 2022-07-29 ENCOUNTER — CLINICAL DOCUMENTATION (OUTPATIENT)
Dept: PAIN MANAGEMENT | Age: 77
End: 2022-07-29

## 2022-07-29 ENCOUNTER — OFFICE VISIT (OUTPATIENT)
Dept: PAIN MANAGEMENT | Age: 77
End: 2022-07-29
Payer: MEDICARE

## 2022-07-29 VITALS — WEIGHT: 181 LBS | HEIGHT: 68 IN | TEMPERATURE: 98.2 F | BODY MASS INDEX: 27.43 KG/M2

## 2022-07-29 DIAGNOSIS — M47.817 LUMBOSACRAL SPONDYLOSIS WITHOUT MYELOPATHY: Primary | ICD-10-CM

## 2022-07-29 DIAGNOSIS — Z79.891 ENCOUNTER FOR MONITORING OPIOID MAINTENANCE THERAPY: ICD-10-CM

## 2022-07-29 DIAGNOSIS — G56.03 BILATERAL CARPAL TUNNEL SYNDROME: ICD-10-CM

## 2022-07-29 DIAGNOSIS — M47.812 CERVICAL SPONDYLOSIS WITHOUT MYELOPATHY: ICD-10-CM

## 2022-07-29 DIAGNOSIS — M79.642 BILATERAL HAND PAIN: ICD-10-CM

## 2022-07-29 DIAGNOSIS — Z51.81 ENCOUNTER FOR MONITORING OPIOID MAINTENANCE THERAPY: ICD-10-CM

## 2022-07-29 DIAGNOSIS — F11.90 CHRONIC, CONTINUOUS USE OF OPIOIDS: ICD-10-CM

## 2022-07-29 DIAGNOSIS — M79.641 BILATERAL HAND PAIN: ICD-10-CM

## 2022-07-29 PROCEDURE — 1123F ACP DISCUSS/DSCN MKR DOCD: CPT | Performed by: PHYSICAL MEDICINE & REHABILITATION

## 2022-07-29 PROCEDURE — 99214 OFFICE O/P EST MOD 30 MIN: CPT | Performed by: PHYSICAL MEDICINE & REHABILITATION

## 2022-07-29 RX ORDER — NALOXONE HYDROCHLORIDE 4 MG/.1ML
1 SPRAY NASAL PRN
Qty: 1 EACH | Refills: 0 | Status: SHIPPED | OUTPATIENT
Start: 2022-07-29

## 2022-07-29 RX ORDER — HYDROCODONE BITARTRATE AND ACETAMINOPHEN 5; 325 MG/1; MG/1
1 TABLET ORAL DAILY PRN
Qty: 30 TABLET | Refills: 0 | Status: SHIPPED | OUTPATIENT
Start: 2022-07-29 | End: 2022-08-28

## 2022-07-29 RX ORDER — TIZANIDINE 4 MG/1
4 TABLET ORAL EVERY EVENING
Qty: 30 TABLET | Refills: 0 | Status: SHIPPED | OUTPATIENT
Start: 2022-07-29 | End: 2022-08-28

## 2022-07-29 RX ORDER — LIDOCAINE 50 MG/G
1 PATCH TOPICAL DAILY
Qty: 30 PATCH | Refills: 0 | Status: SHIPPED | OUTPATIENT
Start: 2022-07-29 | End: 2022-08-28

## 2022-07-29 ASSESSMENT — ENCOUNTER SYMPTOMS
BACK PAIN: 1
NAUSEA: 0
SHORTNESS OF BREATH: 0
DIARRHEA: 0
CONSTIPATION: 0
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
## 2022-08-01 ENCOUNTER — TELEPHONE (OUTPATIENT)
Dept: PAIN MANAGEMENT | Age: 77
End: 2022-08-01

## 2022-08-01 ENCOUNTER — PROCEDURE VISIT (OUTPATIENT)
Dept: PAIN MANAGEMENT | Age: 77
End: 2022-08-01
Payer: MEDICARE

## 2022-08-01 DIAGNOSIS — M47.817 LUMBOSACRAL SPONDYLOSIS WITHOUT MYELOPATHY: ICD-10-CM

## 2022-08-01 DIAGNOSIS — G56.03 BILATERAL CARPAL TUNNEL SYNDROME: Primary | ICD-10-CM

## 2022-08-01 LAB
6-ACETYLMORPHINE, UR: NORMAL
AMPHETAMINE SCREEN, URINE: NORMAL
BARBITURATE SCREEN, URINE: NORMAL
BENZODIAZEPINE SCREEN, URINE: NORMAL
CANNABINOID SCREEN URINE: NORMAL
COCAINE METABOLITE, URINE: NORMAL
CREATININE URINE: NORMAL
EDDP, URINE: NORMAL
ETHANOL URINE: NORMAL
MDMA URINE: NORMAL
METHADONE SCREEN, URINE: NORMAL
METHAMPHETAMINE, URINE: NORMAL
OPIATES, URINE: NORMAL
OXYCODONE: NORMAL
PCP: NORMAL
PH, URINE: NORMAL
PHENCYCLIDINE, URINE: NORMAL
PROPOXYPHENE, URINE: NORMAL
TRICYCLIC ANTIDEPRESSANTS, UR: NORMAL

## 2022-08-01 PROCEDURE — 20526 THER INJECTION CARP TUNNEL: CPT | Performed by: PHYSICAL MEDICINE & REHABILITATION

## 2022-08-01 PROCEDURE — 76942 ECHO GUIDE FOR BIOPSY: CPT | Performed by: PHYSICAL MEDICINE & REHABILITATION

## 2022-08-01 RX ORDER — BETAMETHASONE SODIUM PHOSPHATE AND BETAMETHASONE ACETATE 3; 3 MG/ML; MG/ML
6 INJECTION, SUSPENSION INTRA-ARTICULAR; INTRALESIONAL; INTRAMUSCULAR; SOFT TISSUE ONCE
Status: COMPLETED | OUTPATIENT
Start: 2022-08-01 | End: 2022-08-01

## 2022-08-01 RX ORDER — LIDOCAINE HYDROCHLORIDE 10 MG/ML
5 INJECTION, SOLUTION INFILTRATION; PERINEURAL ONCE
Status: COMPLETED | OUTPATIENT
Start: 2022-08-01 | End: 2022-08-01

## 2022-08-01 RX ADMIN — BETAMETHASONE SODIUM PHOSPHATE AND BETAMETHASONE ACETATE 6 MG: 3; 3 INJECTION, SUSPENSION INTRA-ARTICULAR; INTRALESIONAL; INTRAMUSCULAR; SOFT TISSUE at 13:20

## 2022-08-01 RX ADMIN — LIDOCAINE HYDROCHLORIDE 5 ML: 10 INJECTION, SOLUTION INFILTRATION; PERINEURAL at 13:21

## 2022-08-01 NOTE — PROGRESS NOTES
Carpal Tunnel Corticosteroid Injection under Ultrasound Guidance    Patient Name: Sergio Montilla   : 1945  Date: 2022     Provider: Jignesh Alicea MD        PROCEDURE: Bilateral carpal tunnel corticosteroid injection under ultrasound guidance    INDICATIONS: Discussed the risks of the steroid injection procedure includes but is not limited to bleeding, infection, local skin irritation, skin atrophy, calcification, skin color and pigmentation changes, worsened pain and irritation, burning, damage to surrounding structures, side effects, toxicity, allergic reactions to medications used, immune and stress-response dysfunction, fat necrosis, decreased bone mineralization, increased fracture risk, blood sugar elevation, need for surgery, premature damage or degeneration of the nearby joints, as well as catastrophic injury such as vision loss, paralysis, stroke, loss of use of the wrist and use of the hand, and death. Discussed the risks, benefits, alternative procedures, and alternatives to the procedure including no procedure at all. Discussed that we cannot undo any permanent neurologic or orthopaedic damage or change the course of any underlying disease. After thorough discussion, patient expressed complete understanding and willingness to proceed. Description of Procedure: The sites were marked. A time-out was performed. Ultrasound was used to visualize the pertinent anatomy and identify any critical neurovascular structures. The sites were then prepped in a sterile manner with Betadine three times and alcohol. Attention was turned to the left wrist.  Then a 27-gauge 1.5 inch needle was advanced under direct ultrasound guidance up to the median nerve. Two attempts were required. Aspiration for blood was negative. Then a 3 mL injectate containing 0.5 mL of 3 mg of betamethasone and 2.5 mL of 1% preservative-free lidocaine was injected without resistance or difficulty.  Fair spread of the injectate was directly visualized under ultrasound. The needle was flushed and removed. The site was hemostatic. The site was cleaned and appropriately dressed. Attention was turned to the right wrist.  Then a 27-gauge 1.5 inch needle was advanced under direct ultrasound guidance up to the median nerve. Two attempts were required. Aspiration for blood was negative. Then a 3 mL injectate containing 0.5 mL of 3 mg of betamethasone and 2.5 mL of 1% preservative-free lidocaine was injected without resistance or difficulty. A total of 6 mg of betamethasone was used for today's procedure. Fair spread of the injectate was directly visualized under ultrasound. The needle was flushed and removed. The site was hemostatic. The site was cleaned and appropriately dressed. The patient tolerated the procedure well. There were no immediate post procedure complications. Post procedure instructions were given. The patient will follow-up as previously instructed. He will continue anticoagulation uninterrupted.        [address] [facility] [address]  Phone [phone]

## 2022-08-01 NOTE — TELEPHONE ENCOUNTER
Request for Redetermination of Medicare Prescription Drug Denial for Lidocaine 5% patch faxed to [phone] [facility]

## 2022-08-01 NOTE — TELEPHONE ENCOUNTER
Form for Hydrocodone-Acetaminophen tablet faxed to [facility] [phone], clinical submitted

## 2022-08-01 NOTE — PROGRESS NOTES
Repeat UDS/oral drug swab as patient had difficulty waiting last time he was in the office and there is a chance that he provided an insufficient sample.

## 2022-08-02 NOTE — TELEPHONE ENCOUNTER
Per fax from [employer], Hydrocodone 5-325mg tablet approved. Pharmacy notification letter along w/auth letter faxed to [address] ([phone]).     Case ID #[id number]   6- to 8-1-2023

## 2022-08-08 NOTE — TELEPHONE ENCOUNTER
Per fax from [employer], Lidocaine 5% patch approved. Pharmacy notification letter faxed to [facility] ([phone]).       Case ID #[id number]   1-1-2022 to 8-5-2023

## 2022-08-09 ENCOUNTER — TELEPHONE (OUTPATIENT)
Dept: PAIN MANAGEMENT | Age: 77
End: 2022-08-09

## 2022-08-09 NOTE — TELEPHONE ENCOUNTER
BENEFITS: BILATERAL C5,6,7 MBB    Insurance: Niko Allen [facility]  Phone: [phone]  Contact Name: Jesus Wilhelm  Effective Date: 1.1.2022     Plan year: YES-CALENDAR  Deductible: 200.00      Deductible Met: 200.00  Allowed/benefits paid at: 100% AFTER DEDUCTIBLE  OOP: 1500.00 MET $120.00  Freq Limits: 78024 & 74266--3 IN 1095 DAYS  Prior Auth Requirement: AUTH IS REQUIRED THROUGH [employer]--AUTH COMPLETED BY [employer]    Notes: NO PRE-EX CLAUSE    Call Reference #: [id number]    Time of call: 10:30AM

## 2022-08-16 RX ORDER — OLMESARTAN MEDOXOMIL 40 MG/1
40 TABLET ORAL DAILY
Qty: 90 TABLET | Refills: 0 | Status: SHIPPED | OUTPATIENT
Start: 2022-08-16

## 2022-08-16 NOTE — TELEPHONE ENCOUNTER
----- Message from Kasie Patel sent at 8/16/2022  9:51 AM EDT -----  Subject: Refill Request    QUESTIONS  Name of Medication? olmesartan (BENICAR) 40 MG tablet  Patient-reported dosage and instructions? Patient takes one pill once a   day   How many days do you have left? 1  Preferred Pharmacy? [facility] [id number]  Pharmacy phone number (if available)? [phone]  ---------------------------------------------------------------------------  --------------  Shahid Vale INFO  What is the best way for the office to contact you? OK to leave message on   voicemail  Preferred Call Back Phone Number? [phone]  ---------------------------------------------------------------------------  --------------  SCRIPT ANSWERS  Relationship to Patient?  Self

## 2022-08-17 ENCOUNTER — PROCEDURE VISIT (OUTPATIENT)
Dept: PAIN MANAGEMENT | Age: 77
End: 2022-08-17
Payer: MEDICARE

## 2022-08-17 DIAGNOSIS — M47.812 CERVICAL SPONDYLOSIS WITHOUT MYELOPATHY: Primary | ICD-10-CM

## 2022-08-17 PROCEDURE — 64491 INJ PARAVERT F JNT C/T 2 LEV: CPT | Performed by: PHYSICAL MEDICINE & REHABILITATION

## 2022-08-17 PROCEDURE — 64490 INJ PARAVERT F JNT C/T 1 LEV: CPT | Performed by: PHYSICAL MEDICINE & REHABILITATION

## 2022-08-17 RX ORDER — LIDOCAINE HYDROCHLORIDE 10 MG/ML
5 INJECTION, SOLUTION INFILTRATION; PERINEURAL ONCE
Status: COMPLETED | OUTPATIENT
Start: 2022-08-17 | End: 2022-08-17

## 2022-08-17 RX ORDER — DEXAMETHASONE SODIUM PHOSPHATE 10 MG/ML
5 INJECTION, SOLUTION INTRAMUSCULAR; INTRAVENOUS ONCE
Status: COMPLETED | OUTPATIENT
Start: 2022-08-17 | End: 2022-08-17

## 2022-08-17 RX ADMIN — LIDOCAINE HYDROCHLORIDE 5 ML: 10 INJECTION, SOLUTION INFILTRATION; PERINEURAL at 15:09

## 2022-08-17 RX ADMIN — DEXAMETHASONE SODIUM PHOSPHATE 5 MG: 10 INJECTION, SOLUTION INTRAMUSCULAR; INTRAVENOUS at 15:08

## 2022-08-17 RX ADMIN — Medication 0.5 MEQ: at 15:09

## 2022-08-17 NOTE — PROGRESS NOTES
He tells me that he was using Percocet 10/325 mg from a 2017 prescription from Dr Avinash Davis. He shows me the pill bottle from Dr Avinash Davis. He notes that this prescription for him worked. He states that the Norco 5/325 mg doesn't help him that much.    -We will follow his pain after today's procedure. Consideration for switch of opioids may be given.

## 2022-08-17 NOTE — PROGRESS NOTES
Cervical Medial Branch Blocks        Patient Name: Sergio Montilla   : [id number]  Date: 2022    Provider: Jignesh Alicea MD          Sergio Montilla is here today for interventional pain management. Preoperatively, the patient presents with symptoms and physical exam findings consistent with cervical facet zygapophyseal joint mediated pain. He has had persistent pain that limits his function and activities of daily living. The pain is persistent despite conservative measures. He has significant functional and psychological impairment due to this condition. Given his symptoms, physical exam findings, impairment in activities of daily living, and lack of response to conservative measures, consideration for cervical medial branch blocks was given. Discussed the risks of the procedure including, but not limited to, bleeding, infection, worsened pain, damage to surrounding structures, side effects, toxicity, allergic reactions to medications used, immune and stress-response dysfunction, fat necrosis, skin pigmentation changes, blood sugar elevation, headache, dizziness/lightheadedness, vision changes, need for surgery, as well as catastrophic injury such as vision loss, paralysis, stroke, spinal cord infarction or injury, intrathecal injection, spinal cord puncture, arachnoiditis, bowel or bladder incontinence, loss of use of the arms and/or legs, ventilator dependence, and death. Discussed the risks, benefits, alternative procedures, and alternatives to the procedure including no procedure at all. Discussed that we cannot undo any permanent neurologic damage or change the course of any underlying disease. After thorough discussion, patient expressed understanding and willingness to proceed. Written consent was obtained and is in the chart. Verbal consent to proceed was obtained. Standard ASIPP guidelines were followed and sterile technique used. The patient was positioned in prone position.  Area was cleaned with Betadine three times. Fluoroscopic guidance was used for this procedure. The medial branches were targeted at the waist of their respective articular pillars utilizing intermittent fluoroscopy. Then three 26 gauge 3.5 inch spinal needles were used and each needle was advanced to each medial branch. Aspiration was negative throughout the procedure. Approximately 0.5 mL of 1% preservative-free Lidocaine was injected at each anatomic level without difficulty. AP and lateral views were obtained. Lateral image acquisition was suboptimal. Each medial branch was treated with approximately 0.5 mL of 1% preservative-free Lidocaine. A total of 0.5 mL of 5 mg of Dexamethasone was used for today's procedure. He tolerated the procedure well, no obvious complications occurred during the procedure. He was appropriately monitored and discharged home in stable condition with his usual motor strength. He will keep close track of pain over the next several hours and call our office tomorrow and let us know what percentage of pain relief is experienced. Post-operative instructions were provided. He will continue anticoagulation uninterrupted.        [x] Bilateral [] TON    [] C3   [] Right [] C4    [x] C5   [] Left [x] C6      [x] C7          [address]  Phone [phone]/[facility] [phone]

## 2022-08-22 NOTE — PROGRESS NOTES
[facility].  Spine Surgery  Advanced Pain Management           DME Coordinator: Inell Opitz        Patient Name: Iris Johansen : 1945        Date: 2022       DME Fitting: Bilateral Wrist Splint -     The above brace has been ordered by Dr. Joseph Zelaya for bilateral carpal tunnel syndrome. he will benefit from this brace as it helps to reduce weakness and instability of the wrist. This rigid orthosis is required to stabilize the wrist to improve function and decrease pain. I have shown the patient the brace and demonstrated how to wear it and adjust the straps. I advised the patient wear the splint at night or as directed by their provider. I advised the patient to adjust the brace for comfort and loosen or tighten as needed while maintaining restriction of motion during the night. I had the patient try on the brace and we assessed the fit. The patient was fit the standard size splint. Per my evaluation and discussion with the patient, this brace will work well for the patient. The patient expressed satisfaction with the fit of this brace and understanding of it's use. [address], [facility] [address]  Phone [phone]/AAM [email or web address]

## 2022-08-26 ENCOUNTER — TELEPHONE (OUTPATIENT)
Dept: PAIN MANAGEMENT | Age: 77
End: 2022-08-26

## 2022-08-26 NOTE — TELEPHONE ENCOUNTER
BENEFITS: BILATERAL L3,4,5 MBB    Insurance: Anthony Rivera [facility]  Phone: [phone]  Contact Name: Kimberly Mehta  Effective Date: 1.1.2022     Plan year: YES-CALENDAR  Deductible: 200.00      Deductible Met: 200.00  Allowed/benefits paid at: 100% AFTER DEDUCTIBLE  OOP: 1500.00 MET $160.65  Freq Limits: 67840 & 39723--6 IN 1095 DAYS  Prior Auth Requirement: AUTH IS REQUIRED THROUGH [employer]--AUTH COMPLETED BY [employer]    Notes: NO PRE-EX CLAUSE    Call Reference #: [id number]    Time of call: 11:30AM

## 2022-08-28 DIAGNOSIS — M47.817 LUMBOSACRAL SPONDYLOSIS WITHOUT MYELOPATHY: ICD-10-CM

## 2022-08-28 DIAGNOSIS — M47.812 CERVICAL SPONDYLOSIS WITHOUT MYELOPATHY: ICD-10-CM

## 2022-08-29 RX ORDER — TIZANIDINE 4 MG/1
TABLET ORAL
Qty: 30 TABLET | Refills: 0 | OUTPATIENT
Start: 2022-08-29

## 2022-08-31 ENCOUNTER — PROCEDURE VISIT (OUTPATIENT)
Dept: PAIN MANAGEMENT | Age: 77
End: 2022-08-31
Payer: MEDICARE

## 2022-08-31 DIAGNOSIS — M47.817 LUMBOSACRAL SPONDYLOSIS WITHOUT MYELOPATHY: Primary | ICD-10-CM

## 2022-08-31 PROCEDURE — 64494 INJ PARAVERT F JNT L/S 2 LEV: CPT | Performed by: PHYSICAL MEDICINE & REHABILITATION

## 2022-08-31 PROCEDURE — 64493 INJ PARAVERT F JNT L/S 1 LEV: CPT | Performed by: PHYSICAL MEDICINE & REHABILITATION

## 2022-08-31 RX ORDER — LIDOCAINE HYDROCHLORIDE 10 MG/ML
5 INJECTION, SOLUTION INFILTRATION; PERINEURAL ONCE
Status: COMPLETED | OUTPATIENT
Start: 2022-08-31 | End: 2022-08-31

## 2022-08-31 RX ORDER — BETAMETHASONE SODIUM PHOSPHATE AND BETAMETHASONE ACETATE 3; 3 MG/ML; MG/ML
3 INJECTION, SUSPENSION INTRA-ARTICULAR; INTRALESIONAL; INTRAMUSCULAR; SOFT TISSUE ONCE
Status: COMPLETED | OUTPATIENT
Start: 2022-08-31 | End: 2022-08-31

## 2022-08-31 RX ADMIN — LIDOCAINE HYDROCHLORIDE 5 ML: 10 INJECTION, SOLUTION INFILTRATION; PERINEURAL at 15:28

## 2022-08-31 RX ADMIN — Medication 0.5 MEQ: at 15:28

## 2022-08-31 RX ADMIN — BETAMETHASONE SODIUM PHOSPHATE AND BETAMETHASONE ACETATE 3 MG: 3; 3 INJECTION, SUSPENSION INTRA-ARTICULAR; INTRALESIONAL; INTRAMUSCULAR; SOFT TISSUE at 15:28

## 2022-08-31 NOTE — PROGRESS NOTES
Lumbar Medial Branch Blocks      Patient Name: Sandra Alvarado   :   Date: 2022     Provider: Dimitri Xiong MD        Sandra Alvarado is here today for interventional pain management. Preoperatively, the patient presents with symptoms and physical exam findings consistent with lumbar facet zygapophyseal joint mediated pain. He has had persistent pain that limits his function and activities of daily living. The pain is persistent despite conservative measures. He has significant functional and psychological impairment due to this condition. Given his symptoms, physical exam findings, impairment in activities of daily living, and lack of response to conservative measures, consideration for lumbar medial branch blocks was given. Discussed the risks of the procedure including, but not limited to, bleeding, infection, worsened pain, damage to surrounding structures, side effects, toxicity, allergic reactions to medications used, immune and stress-response dysfunction, fat necrosis, avascular necrosis, skin pigmentation changes, blood sugar elevation, headache, vision changes, need for surgery, as well as catastrophic injury such as vision loss, paralysis, stroke, spinal cord infarction or injury, intrathecal injection, spinal cord puncture, arachnoiditis, bowel or bladder incontinence, loss of use of the legs, ventilator dependence, and death. Discussed the risks, benefits, alternative procedures, and alternatives to the procedure including no procedure at all. Discussed that we cannot undo any permanent neurologic damage or change the course of any underlying disease. After thorough discussion, patient expressed understanding and willingness to proceed. Written consent was obtained and is in the chart. Verbal consent to proceed was obtained. Standard ASIPP guidelines were followed and sterile technique used. Area was cleaned with Betadine three times.  Fluoroscopic guidance was used for this procedure. The L5 vertebral body was taken as the first lumbar-appearing vertebral body directly above the sacrum on a lateral view. Junction of superior articular process and transverse process was identified. For L5 dorsal primary ramus groove of sacral ala and superior articular process of S1 was identified. Then two 26 gauge 3.5 inch spinal needles were used and each needle was advanced to each medial branch and/or dorsal ramus. Aspiration was negative throughout the procedure. Oblique and declined views were obtained. Each medial branch and/or dorsal ramus was treated with approximately 0.5 mL of 1% preservative-free Lidocaine. A total of 0.5 mL of 3 mg of Betamethasone was used for today's procedure. He tolerated the procedure well, no obvious complications occurred during the procedure. He was appropriately monitored and discharged home in stable condition with his usual motor strength. He will keep close track of pain over the next several hours and call our office tomorrow and let us know what percentage of pain relief is experienced. Postoperative instructions were provided. He will continue anticoagulation uninterrupted.           [x] Bilateral [] T12    [] L1   [] Right [] L2    [x] L3   [] Left [x] L4      [x] L5             John, [address] [facility], [facility] [address]  Phone [phone]

## 2022-10-10 DIAGNOSIS — M47.817 LUMBOSACRAL SPONDYLOSIS WITHOUT MYELOPATHY: ICD-10-CM

## 2022-10-10 DIAGNOSIS — M47.812 CERVICAL SPONDYLOSIS WITHOUT MYELOPATHY: ICD-10-CM

## 2022-10-11 RX ORDER — LIDOCAINE 50 MG/G
PATCH TOPICAL
Qty: 30 PATCH | Refills: 0 | OUTPATIENT
Start: 2022-10-11

## 2022-10-31 ENCOUNTER — OFFICE VISIT (OUTPATIENT)
Dept: PAIN MANAGEMENT | Age: 77
End: 2022-10-31
Payer: MEDICARE

## 2022-10-31 VITALS
TEMPERATURE: 97.2 F | SYSTOLIC BLOOD PRESSURE: 132 MMHG | HEIGHT: 68 IN | BODY MASS INDEX: 27.28 KG/M2 | DIASTOLIC BLOOD PRESSURE: 74 MMHG | WEIGHT: 180 LBS

## 2022-10-31 DIAGNOSIS — M47.812 CERVICAL SPONDYLOSIS WITHOUT MYELOPATHY: ICD-10-CM

## 2022-10-31 DIAGNOSIS — M47.817 LUMBOSACRAL SPONDYLOSIS WITHOUT MYELOPATHY: ICD-10-CM

## 2022-10-31 DIAGNOSIS — F11.90 CHRONIC, CONTINUOUS USE OF OPIOIDS: ICD-10-CM

## 2022-10-31 DIAGNOSIS — Z51.81 ENCOUNTER FOR MONITORING OPIOID MAINTENANCE THERAPY: ICD-10-CM

## 2022-10-31 DIAGNOSIS — Z79.891 ENCOUNTER FOR MONITORING OPIOID MAINTENANCE THERAPY: ICD-10-CM

## 2022-10-31 DIAGNOSIS — G56.03 BILATERAL CARPAL TUNNEL SYNDROME: ICD-10-CM

## 2022-10-31 DIAGNOSIS — M79.642 BILATERAL HAND PAIN: ICD-10-CM

## 2022-10-31 DIAGNOSIS — M79.641 BILATERAL HAND PAIN: ICD-10-CM

## 2022-10-31 PROCEDURE — 99214 OFFICE O/P EST MOD 30 MIN: CPT | Performed by: PHYSICAL MEDICINE & REHABILITATION

## 2022-10-31 PROCEDURE — 3074F SYST BP LT 130 MM HG: CPT | Performed by: PHYSICAL MEDICINE & REHABILITATION

## 2022-10-31 PROCEDURE — 3078F DIAST BP <80 MM HG: CPT | Performed by: PHYSICAL MEDICINE & REHABILITATION

## 2022-10-31 PROCEDURE — 1123F ACP DISCUSS/DSCN MKR DOCD: CPT | Performed by: PHYSICAL MEDICINE & REHABILITATION

## 2022-10-31 RX ORDER — OXYCODONE HYDROCHLORIDE AND ACETAMINOPHEN 5; 325 MG/1; MG/1
1 TABLET ORAL DAILY PRN
Qty: 30 TABLET | Refills: 0 | Status: SHIPPED | OUTPATIENT
Start: 2022-10-31 | End: 2022-12-01 | Stop reason: SDUPTHER

## 2022-10-31 RX ORDER — BACLOFEN 5 MG/1
5 TABLET ORAL NIGHTLY PRN
Qty: 30 TABLET | Refills: 0 | Status: SHIPPED | OUTPATIENT
Start: 2022-10-31 | End: 2022-11-30

## 2022-10-31 RX ORDER — BACLOFEN 5 MG/1
10 TABLET ORAL NIGHTLY PRN
Qty: 30 TABLET | Refills: 0 | Status: SHIPPED | OUTPATIENT
Start: 2022-10-31 | End: 2022-10-31

## 2022-10-31 ASSESSMENT — ENCOUNTER SYMPTOMS
DIARRHEA: 0
NAUSEA: 0
CONSTIPATION: 0
SHORTNESS OF BREATH: 0
BACK PAIN: 1
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
## 2022-11-01 ENCOUNTER — TELEPHONE (OUTPATIENT)
Dept: PAIN MANAGEMENT | Age: 77
End: 2022-11-01

## 2022-11-01 NOTE — TELEPHONE ENCOUNTER
BILAT CARPAL TUNNEL INJECTION UNDER US    NO AUTH REQUIRED    OK to schedule procedure approved as above. Please note sides/levels approved and date range. (If applicable, sides/levels approved may differ from those ordered)    TO BE SCHEDULED WITH DR. Brenton Paul

## 2022-11-03 ENCOUNTER — TELEPHONE (OUTPATIENT)
Dept: PAIN MANAGEMENT | Age: 77
End: 2022-11-03

## 2022-11-06 DIAGNOSIS — M79.642 BILATERAL HAND PAIN: ICD-10-CM

## 2022-11-06 DIAGNOSIS — M79.641 BILATERAL HAND PAIN: ICD-10-CM

## 2022-11-13 NOTE — TELEPHONE ENCOUNTER
Notes      12/1/2022 Jovi Main, APRN - [address] Pain Management 1 MONTH FOLLOW UP WITH RS PER    12/2/2022 MD ARTHUR [facility] Pain Management Bilateral Upper Extremities   1/26/2023 Rosanne Guerrier MD [facility] Primary [facility] Return in 6 months (on 1/26/2023) for checkup fasting labs .      Past Visits    Date Provider Specialty Visit Type Primary Dx   10/31/2022 Len Carnes MD Pain Management Office Visit Cervical spondylosis without myelopathy   08/31/2022 Len Carnes MD Pain Management Procedure visit Lumbosacral spondylosis without myelopathy   08/17/2022 Len Carnes MD Pain Management Procedure visit Cervical spondylosis without myelopathy   08/01/2022 Len Carnes MD Pain Management Procedure visit Bilateral carpal tunnel syndrome   07/29/2022 Len Carnes MD Pain Management Office Visit Lumbosacral spondylosis without myelopathy

## 2022-11-14 RX ORDER — OLMESARTAN MEDOXOMIL 40 MG/1
40 TABLET ORAL DAILY
Qty: 90 TABLET | Refills: 0 | Status: SHIPPED | OUTPATIENT
Start: 2022-11-14

## 2022-11-30 ENCOUNTER — PROCEDURE VISIT (OUTPATIENT)
Dept: PAIN MANAGEMENT | Age: 77
End: 2022-11-30

## 2022-11-30 DIAGNOSIS — M47.817 LUMBOSACRAL SPONDYLOSIS WITHOUT MYELOPATHY: Primary | ICD-10-CM

## 2022-11-30 RX ORDER — LIDOCAINE HYDROCHLORIDE 10 MG/ML
5 INJECTION, SOLUTION INFILTRATION; PERINEURAL ONCE
Status: COMPLETED | OUTPATIENT
Start: 2022-11-30 | End: 2022-11-30

## 2022-11-30 RX ORDER — BETAMETHASONE SODIUM PHOSPHATE AND BETAMETHASONE ACETATE 3; 3 MG/ML; MG/ML
3 INJECTION, SUSPENSION INTRA-ARTICULAR; INTRALESIONAL; INTRAMUSCULAR; SOFT TISSUE ONCE
Status: COMPLETED | OUTPATIENT
Start: 2022-11-30 | End: 2022-11-30

## 2022-11-30 RX ADMIN — Medication 0.5 MEQ: at 15:53

## 2022-11-30 RX ADMIN — BETAMETHASONE SODIUM PHOSPHATE AND BETAMETHASONE ACETATE 3 MG: 3; 3 INJECTION, SUSPENSION INTRA-ARTICULAR; INTRALESIONAL; INTRAMUSCULAR; SOFT TISSUE at 15:52

## 2022-11-30 RX ADMIN — LIDOCAINE HYDROCHLORIDE 5 ML: 10 INJECTION, SOLUTION INFILTRATION; PERINEURAL at 15:53

## 2022-11-30 NOTE — PROGRESS NOTES
Lumbar Medial Branch Blocks -2nd Diagnostic      Patient Name: Faith Rose   :   Date: 2022     Provider: Jean Pierre Elliott MD        Faith Rose is here today for interventional pain management. Preoperatively, the patient presents with symptoms and physical exam findings consistent with lumbar facet zygapophyseal joint mediated pain. He has had persistent pain that limits his function and activities of daily living. The pain is persistent despite conservative measures. He has significant functional and psychological impairment due to this condition. Given his symptoms, physical exam findings, impairment in activities of daily living, and lack of response to conservative measures, consideration for lumbar medial branch blocks was given. Discussed the risks of the procedure including, but not limited to, bleeding, infection, worsened pain, damage to surrounding structures, side effects, toxicity, allergic reactions to medications used, immune and stress-response dysfunction, fat necrosis, avascular necrosis, skin pigmentation changes, blood sugar elevation, headache, vision changes, need for surgery, as well as catastrophic injury such as vision loss, paralysis, stroke, spinal cord infarction or injury, intrathecal injection, spinal cord puncture, arachnoiditis, bowel or bladder incontinence, loss of use of the legs, ventilator dependence, and death. Discussed the risks, benefits, alternative procedures, and alternatives to the procedure including no procedure at all. Discussed that we cannot undo any permanent neurologic damage or change the course of any underlying disease. After thorough discussion, patient expressed understanding and willingness to proceed. Written consent was obtained and is in the chart. Verbal consent to proceed was obtained. Standard ASIPP guidelines were followed and sterile technique used. Area was cleaned with Betadine three times.  Fluoroscopic guidance was used for this procedure. The L5 vertebral body was taken as the first lumbar-appearing vertebral body directly above the sacrum on a lateral view. Junction of superior articular process and transverse process was identified. For L5 dorsal primary ramus groove of sacral ala and superior articular process of S1 was identified. Then two 26 gauge 3.5 inch spinal needles were used and each needle was advanced to each medial branch and/or dorsal ramus. Aspiration was negative throughout the procedure. Oblique and declined views were obtained. Each medial branch and/or dorsal ramus was treated with approximately 0.5 mL of 1% preservative-free Lidocaine. A total of 0.5 mL of 3 mg of Betamethasone was used for today's procedure. He tolerated the procedure well, no obvious complications occurred during the procedure. He was appropriately monitored and discharged home in stable condition with his usual motor strength. He will keep close track of pain over the next several hours and call our office tomorrow and let us know what percentage of pain relief is experienced. Postoperative instructions were provided. He will continue anticoagulation uninterrupted.           [x] Bilateral [] T12    [] L1   [] Right [] L2    [x] L3   [] Left [x] L4      [x] L5         [address] [facility]  Phone [phone]

## 2022-12-01 ENCOUNTER — OFFICE VISIT (OUTPATIENT)
Dept: PAIN MANAGEMENT | Age: 77
End: 2022-12-01
Payer: MEDICARE

## 2022-12-01 VITALS
SYSTOLIC BLOOD PRESSURE: 128 MMHG | HEIGHT: 68 IN | BODY MASS INDEX: 27.28 KG/M2 | TEMPERATURE: 98.2 F | WEIGHT: 180 LBS | DIASTOLIC BLOOD PRESSURE: 76 MMHG

## 2022-12-01 DIAGNOSIS — Z51.81 ENCOUNTER FOR MONITORING OPIOID MAINTENANCE THERAPY: ICD-10-CM

## 2022-12-01 DIAGNOSIS — G56.03 BILATERAL CARPAL TUNNEL SYNDROME: ICD-10-CM

## 2022-12-01 DIAGNOSIS — M47.817 LUMBOSACRAL SPONDYLOSIS WITHOUT MYELOPATHY: Primary | ICD-10-CM

## 2022-12-01 DIAGNOSIS — F11.90 CHRONIC, CONTINUOUS USE OF OPIOIDS: ICD-10-CM

## 2022-12-01 DIAGNOSIS — Z79.891 ENCOUNTER FOR MONITORING OPIOID MAINTENANCE THERAPY: ICD-10-CM

## 2022-12-01 DIAGNOSIS — M47.812 CERVICAL SPONDYLOSIS WITHOUT MYELOPATHY: ICD-10-CM

## 2022-12-01 PROCEDURE — 99214 OFFICE O/P EST MOD 30 MIN: CPT | Performed by: NURSE PRACTITIONER

## 2022-12-01 PROCEDURE — 3074F SYST BP LT 130 MM HG: CPT | Performed by: NURSE PRACTITIONER

## 2022-12-01 PROCEDURE — 1123F ACP DISCUSS/DSCN MKR DOCD: CPT | Performed by: NURSE PRACTITIONER

## 2022-12-01 PROCEDURE — 3078F DIAST BP <80 MM HG: CPT | Performed by: NURSE PRACTITIONER

## 2022-12-01 RX ORDER — OXYCODONE HYDROCHLORIDE AND ACETAMINOPHEN 5; 325 MG/1; MG/1
1 TABLET ORAL DAILY PRN
Qty: 30 TABLET | Refills: 0 | Status: SHIPPED | OUTPATIENT
Start: 2022-12-01 | End: 2022-12-31

## 2022-12-01 ASSESSMENT — ENCOUNTER SYMPTOMS
SHORTNESS OF BREATH: 0
BLOOD IN STOOL: 0
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
## 2022-12-02 ENCOUNTER — PROCEDURE VISIT (OUTPATIENT)
Dept: PAIN MANAGEMENT | Age: 77
End: 2022-12-02

## 2022-12-02 DIAGNOSIS — M79.641 BILATERAL HAND PAIN: Primary | ICD-10-CM

## 2022-12-02 DIAGNOSIS — M79.642 BILATERAL HAND PAIN: Primary | ICD-10-CM

## 2022-12-02 NOTE — PROGRESS NOTES
Electromyography (EMG)/Nerve conduction studies (NCS) Report: Upper Extremities    Name: Kita Nichole   : 1945  Date: 2022   Physician: Monisha Hylton MD        INDICATIONS: Kita Nichole is a 78 y.o. male who presents for electrodiagnostic evaluation for bilateral hand pain. He is right-handed. Both limbs are necessary to examine in order to evaluate for any evidence of systemic disease as well as establish normal baseline values from which to compare any abnormal unilateral findings. The study is explained and verbal consent to proceed is obtained. NERVE CONDUCTION STUDIES:  Sensory nerve conduction studies: Bilateral median sensory nerve conduction studies to the second digit demonstrate prolonged peak latencies and diminished amplitudes. Bilateral ulnar sensory nerve conduction studies to the fifth digit demonstrate normal peak latencies and amplitudes, waveforms are limited bilaterally. Bilateral radial sensory nerve conduction studies to the base of the thumb demonstrate normal peak latencies and amplitudes. Bilateral upper limb temperatures are normal.     Motor nerve conduction studies: Left median motor nerve conduction study with pickup over the abductor pollicis brevis demonstrates prolonged distal latency and normal amplitude. Right median motor nerve conduction study with pickup over the abductor pollicis brevis demonstrates normal distal latency and normal amplitude. Bilateral ulnar motor nerve conduction studies with pickup over the abductor digiti minimi demonstrate normal distal latencies and amplitudes. Conduction velocities in the right forearm and across the right elbow are normal.    ELECTROMYOGRAPHY: A disposable monopolar needle is used to evaluate bilateral deltoid, biceps, triceps, brachioradialis, extensor indicis proprius, first dorsal interosseous, and opponens pollicis.  All of the muscles sampled are free of any increased insertional activity or any abnormal spontaneous activity. Motor unit recruitment is unremarkable. Bilateral mid cervical paraspinal muscle sampling is free of any increased insertional activity or any abnormal spontaneous activity. SUMMARY:  This study is abnormal:  1. 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 This is moderate on the left and mild on the right in degree by electrical criteria. There is no active denervation on electromyography bilaterally. 2. There is no current evidence for an active bilateral cervical motor radiculopathy or generalized large fiber sensorimotor peripheral polyneuropathy. RECOMMENDATIONS: The patient should follow up as previously instructed. If his symptoms persist or worsen, further electrodiagnostic evaluation may be considered if the patient is agreeable. Clinical correlation is recommended.

## 2022-12-05 ENCOUNTER — PROCEDURE VISIT (OUTPATIENT)
Dept: PAIN MANAGEMENT | Age: 77
End: 2022-12-05

## 2022-12-05 DIAGNOSIS — G56.03 BILATERAL CARPAL TUNNEL SYNDROME: Primary | ICD-10-CM

## 2022-12-05 RX ORDER — LIDOCAINE HYDROCHLORIDE 10 MG/ML
5 INJECTION, SOLUTION INFILTRATION; PERINEURAL ONCE
Status: COMPLETED | OUTPATIENT
Start: 2022-12-05 | End: 2022-12-05

## 2022-12-05 RX ORDER — BETAMETHASONE SODIUM PHOSPHATE AND BETAMETHASONE ACETATE 3; 3 MG/ML; MG/ML
3 INJECTION, SUSPENSION INTRA-ARTICULAR; INTRALESIONAL; INTRAMUSCULAR; SOFT TISSUE ONCE
Status: COMPLETED | OUTPATIENT
Start: 2022-12-05 | End: 2022-12-05

## 2022-12-05 RX ADMIN — LIDOCAINE HYDROCHLORIDE 5 ML: 10 INJECTION, SOLUTION INFILTRATION; PERINEURAL at 14:26

## 2022-12-05 RX ADMIN — BETAMETHASONE SODIUM PHOSPHATE AND BETAMETHASONE ACETATE 3 MG: 3; 3 INJECTION, SUSPENSION INTRA-ARTICULAR; INTRALESIONAL; INTRAMUSCULAR; SOFT TISSUE at 14:27

## 2022-12-05 NOTE — PROGRESS NOTES
Carpal Tunnel Corticosteroid Injection under Ultrasound Guidance    Patient Name: Lalo Moses   : 1945  Date: 2022     Provider: Yuri Pollack MD        PROCEDURE: Bilateral carpal tunnel corticosteroid injection under ultrasound guidance    INDICATIONS: Discussed the risks of the steroid injection procedure includes but is not limited to bleeding, infection, local skin irritation, skin atrophy, calcification, skin color and pigmentation changes, worsened pain and irritation, burning, damage to surrounding structures, side effects, toxicity, allergic reactions to medications used, immune and stress-response dysfunction, fat necrosis, decreased bone mineralization, increased fracture risk, blood sugar elevation, need for surgery, premature damage or degeneration of the nearby joints, as well as catastrophic injury such as vision loss, paralysis, stroke, loss of use of the wrist and use of the hand, and death. Discussed the risks, benefits, alternative procedures, and alternatives to the procedure including no procedure at all. Discussed that we cannot undo any permanent neurologic or orthopaedic damage or change the course of any underlying disease. After thorough discussion, patient expressed complete understanding and willingness to proceed. Description of Procedure: The sites were marked. A time-out was performed. Ultrasound was used to visualize the pertinent anatomy and identify any critical neurovascular structures. The sites were then prepped in a sterile manner with Betadine three times and alcohol. Attention was turned to the left wrist.  Then a 27-gauge 1.5 inch needle was advanced under direct ultrasound guidance up to the median nerve. Two attempts were required. Aspiration for blood was negative. Then a 3 mL injectate containing 0.25 mL of 1.5 mg of betamethasone and 2.75 mL of 1% preservative-free lidocaine was injected without resistance or difficulty.  Fair spread of the injectate was directly visualized under ultrasound. The needle was flushed and removed. The site was hemostatic. The site was cleaned and appropriately dressed. Attention was turned to the right wrist.  Then a 27-gauge 1.5 inch needle was advanced under direct ultrasound guidance up to the median nerve. Two attempts were required. Aspiration for blood was negative. Then a 3 mL injectate containing 0.25 mL of 1.5 mg of betamethasone and 2.75 mL of 1% preservative-free lidocaine was injected without resistance or difficulty. A total of 3 mg of betamethasone was used for today's procedure. Fair spread of the injectate was directly visualized under ultrasound. The needle was flushed and removed. The site was hemostatic. The site was cleaned and appropriately dressed. The patient tolerated the procedure well. There were no immediate post procedure complications. Post procedure instructions were given. The patient will follow-up as previously instructed. He will continue anticoagulation uninterrupted.        [address] [facility] [address]  Phone [phone]/[facility] [phone]

## 2022-12-08 ENCOUNTER — TELEPHONE (OUTPATIENT)
Dept: PAIN MANAGEMENT | Age: 77
End: 2022-12-08

## 2022-12-08 NOTE — TELEPHONE ENCOUNTER
GHAZALA L3,4,5 RFA    AUTH APPROVED FROM 12/8/22-6/6/23    OK to schedule procedure approved as above. Please note sides/levels approved and date range. (If applicable, sides/levels approved may differ from those ordered)    TO BE SCHEDULED WITH DR. Anatoliy Echeverria

## 2022-12-08 NOTE — TELEPHONE ENCOUNTER
Left voicemail for the patient to schedule procedure.    (ZAKI-BILAT L3,4,5 RFA-AUTH APPROVED 12/8/22-6/6/23)

## 2022-12-14 NOTE — TELEPHONE ENCOUNTER
3rd attempt- I called and spoke with patient. Patient stated that he does not wish to schedule at this time.

## 2022-12-22 ENCOUNTER — OFFICE VISIT (OUTPATIENT)
Dept: PAIN MANAGEMENT | Age: 77
End: 2022-12-22

## 2022-12-22 DIAGNOSIS — F11.90 CHRONIC, CONTINUOUS USE OF OPIOIDS: ICD-10-CM

## 2022-12-22 DIAGNOSIS — G56.03 BILATERAL CARPAL TUNNEL SYNDROME: ICD-10-CM

## 2022-12-22 DIAGNOSIS — M47.812 CERVICAL SPONDYLOSIS WITHOUT MYELOPATHY: ICD-10-CM

## 2022-12-22 DIAGNOSIS — Z51.81 ENCOUNTER FOR MONITORING OPIOID MAINTENANCE THERAPY: ICD-10-CM

## 2022-12-22 DIAGNOSIS — M47.817 LUMBOSACRAL SPONDYLOSIS WITHOUT MYELOPATHY: ICD-10-CM

## 2022-12-22 DIAGNOSIS — Z79.891 ENCOUNTER FOR MONITORING OPIOID MAINTENANCE THERAPY: ICD-10-CM

## 2022-12-22 DIAGNOSIS — M47.817 LUMBOSACRAL SPONDYLOSIS WITHOUT MYELOPATHY: Primary | ICD-10-CM

## 2022-12-22 RX ORDER — BACLOFEN 5 MG/1
TABLET ORAL
Qty: 30 TABLET | Refills: 0 | OUTPATIENT
Start: 2022-12-22

## 2022-12-22 RX ORDER — BETAMETHASONE SODIUM PHOSPHATE AND BETAMETHASONE ACETATE 3; 3 MG/ML; MG/ML
3 INJECTION, SUSPENSION INTRA-ARTICULAR; INTRALESIONAL; INTRAMUSCULAR; SOFT TISSUE ONCE
Status: COMPLETED | OUTPATIENT
Start: 2022-12-22 | End: 2022-12-22

## 2022-12-22 RX ORDER — OXYCODONE HYDROCHLORIDE AND ACETAMINOPHEN 5; 325 MG/1; MG/1
1 TABLET ORAL DAILY PRN
Qty: 30 TABLET | Refills: 0 | Status: SHIPPED | OUTPATIENT
Start: 2022-12-31 | End: 2023-01-30

## 2022-12-22 RX ORDER — LIDOCAINE HYDROCHLORIDE 10 MG/ML
10 INJECTION, SOLUTION INFILTRATION; PERINEURAL ONCE
Status: COMPLETED | OUTPATIENT
Start: 2022-12-22 | End: 2022-12-22

## 2022-12-22 RX ADMIN — BETAMETHASONE SODIUM PHOSPHATE AND BETAMETHASONE ACETATE 3 MG: 3; 3 INJECTION, SUSPENSION INTRA-ARTICULAR; INTRALESIONAL; INTRAMUSCULAR; SOFT TISSUE at 13:05

## 2022-12-22 RX ADMIN — LIDOCAINE HYDROCHLORIDE 10 ML: 10 INJECTION, SOLUTION INFILTRATION; PERINEURAL at 13:06

## 2022-12-22 RX ADMIN — Medication 0.5 MEQ: at 13:06

## 2022-12-22 NOTE — PROGRESS NOTES
-Continue Percocet 5/325 mg daily prn #30 no ref start 12/31/22-1/30/23    Controlled Substance Monitoring:    Acute and Chronic Pain Monitoring:   RX Monitoring 12/22/2022   Attestation -   Periodic Controlled Substance Monitoring Obtaining appropriate analgesic effect of treatment.

## 2022-12-22 NOTE — PROGRESS NOTES
Lumbar Radiofrequency Ablation/Neurotomy          Patient Name: Steff Diaz   :   Date: 2022     Provider: Mayda Chance MD        Steff Diaz is here today for interventional pain management. Preoperatively, the patient presents with symptoms and physical exam findings consistent with lumbar facet zygapophyseal joint mediated pain. He has had persistent pain that limits his function and activities of daily living. The pain is persistent despite conservative measures. He has significant functional and psychological impairment due to this condition. He has undergone diagnostic medial branch blocks with a positive diagnostic response. Given his symptoms, physical exam findings, impairment in activities of daily living, lack of response to conservative measures, and positive diagnostic response to medial branch blocks, consideration for lumbar facet/medial branch radiofrequency ablation/neurotomy was given. Discussed the risks of the procedure including, but not limited to, bleeding, infection, worsened pain, damage to surrounding structures, post-ablation neuritis, worsened paresthesias, side effects, toxicity, allergic reactions to medications used, immune and stress-response dysfunction, fat necrosis, avascular necrosis, skin pigmentation changes, blood sugar elevation, headache, vision changes, need for surgery, as well as catastrophic injury such as vision loss, hip and/or leg weakness, paralysis, stroke, spinal cord infarction or injury, spinal cord puncture, arachnoiditis, bowel injury, bowel or bladder incontinence, sexual dysfunction, loss of use of the legs, ventilator dependence, and death. Discussed the risks, benefits, alternative procedures, and alternatives to the procedure including no procedure at all. Discussed that we cannot undo any permanent neurologic damage or change the course of any underlying disease.  After thorough discussion, patient expressed understanding and willingness to proceed. Written consent was obtained and is in the chart. Verbal consent to proceed was obtained. Standard ASIPP guidelines were followed and sterile technique used. The patient was positioned prone on the procedure table. Area was cleaned with Betadine three times. Fluoroscopic guidance was used for this procedure. Multiple views of fluoroscopy were used during procedure to assist with needle placement. The L5 vertebral body was taken as the first lumbar-appearing vertebral body directly above the sacrum on a lateral view. Appropriate oblique and declined views were obtained to open up the sulcii for the medial branch blocks and/or dorsal rami as appropriate. Then three 10 cm 20 gauge 10 mm active tip radiofrequency cannulas were used and advanced to the appropriate anatomic locations. There was limited multifidus contraction noted with motor stimulation at 2 Hz between 0.5-1.5 volts. No limb or gluteal contraction was noted taking it up to 3.5 volts. Aspiration was negative throughout the procedure. Prior to lesioning at 80 degrees Celsius for 90 seconds, approximately 0.5 mg of Betamethasone and 0.5 mL of 1% preservative-free Lidocaine was injected. Impedance was between 200-400 ohms during the procedure. One lesion was created at each level, SIS approach and extended time were utilized. Patient tolerated the procedure well, no obvious complications occurred during the procedure. Patient was appropriately monitored and discharged home in stable condition with their usual motor strength. Post-procedure instructions were given to the patient. He will continue anticoagulation uninterrupted.        [x] Bilateral [] L1    [] L2   [] Right [x] L3    [x] L4   [] Left [x] L5              [facility] [address]  Phone [phone]

## 2023-01-26 ENCOUNTER — OFFICE VISIT (OUTPATIENT)
Dept: FAMILY MEDICINE CLINIC | Age: 78
End: 2023-01-26

## 2023-01-26 VITALS
SYSTOLIC BLOOD PRESSURE: 124 MMHG | WEIGHT: 159.7 LBS | HEART RATE: 85 BPM | HEIGHT: 68 IN | BODY MASS INDEX: 24.2 KG/M2 | DIASTOLIC BLOOD PRESSURE: 64 MMHG | TEMPERATURE: 98.1 F | OXYGEN SATURATION: 96 %

## 2023-01-26 DIAGNOSIS — I10 HYPERTENSION, UNSPECIFIED TYPE: Primary | ICD-10-CM

## 2023-01-26 DIAGNOSIS — K21.9 GASTROESOPHAGEAL REFLUX DISEASE WITHOUT ESOPHAGITIS: ICD-10-CM

## 2023-01-26 DIAGNOSIS — M19.90 ARTHRITIS: ICD-10-CM

## 2023-01-26 DIAGNOSIS — Z12.5 SCREENING PSA (PROSTATE SPECIFIC ANTIGEN): ICD-10-CM

## 2023-01-26 DIAGNOSIS — E78.5 HYPERLIPIDEMIA, UNSPECIFIED HYPERLIPIDEMIA TYPE: ICD-10-CM

## 2023-01-26 SDOH — ECONOMIC STABILITY: FOOD INSECURITY: WITHIN THE PAST 12 MONTHS, THE FOOD YOU BOUGHT JUST DIDN'T LAST AND YOU DIDN'T HAVE MONEY TO GET MORE.: NEVER TRUE

## 2023-01-26 SDOH — ECONOMIC STABILITY: FOOD INSECURITY: WITHIN THE PAST 12 MONTHS, YOU WORRIED THAT YOUR FOOD WOULD RUN OUT BEFORE YOU GOT MONEY TO BUY MORE.: NEVER TRUE

## 2023-01-26 ASSESSMENT — PATIENT HEALTH QUESTIONNAIRE - PHQ9
SUM OF ALL RESPONSES TO PHQ QUESTIONS 1-9: 1
1. LITTLE INTEREST OR PLEASURE IN DOING THINGS: 0
SUM OF ALL RESPONSES TO PHQ9 QUESTIONS 1 & 2: 1
SUM OF ALL RESPONSES TO PHQ QUESTIONS 1-9: 1
SUM OF ALL RESPONSES TO PHQ QUESTIONS 1-9: 1
2. FEELING DOWN, DEPRESSED OR HOPELESS: 1
SUM OF ALL RESPONSES TO PHQ QUESTIONS 1-9: 1

## 2023-01-26 ASSESSMENT — SOCIAL DETERMINANTS OF HEALTH (SDOH): HOW HARD IS IT FOR YOU TO PAY FOR THE VERY BASICS LIKE FOOD, HOUSING, MEDICAL CARE, AND HEATING?: NOT HARD AT ALL

## 2023-01-26 NOTE — PROGRESS NOTES
Chief Complaint   Patient presents with    Hypertension     6 month      Osorio Griffiths is a 78 y.o. male    hyperlipid  Ok on lipitor    Sleeps ok with tylenol and ibuprofen     Ongoing pain complaints  Hands stiff/hurt  Seeing pain mgmt     Arthritis pain, worse with cold weather     Had previous surgery on his left arm  Having pain/locks up when he reaches behind    Issues with gabapentin and lyrica in past (intolerance)        Patient Active Problem List   Diagnosis    GERD (gastroesophageal reflux disease)    Hyperlipidemia    Hypertension    Chronic nasal congestion    Prostatitis    Low back pain    Hand pain    High risk medication use - [facility] PM&R 5/9/17, 07/12/17 [facility] PM&R, 01/24/17 Med Contract PM&R, 07/13/17 Urine Drug Screen: negative PM&R    DDD (degenerative disc disease), lumbar    Myalgia    Sciatic pain    Greater trochanteric bursitis    C7 radiculopathy    Bilateral carpal tunnel syndrome    Ulnar neuropathy of left upper extremity    Lateral epicondylitis (tennis elbow)    Cervical radiculopathy at C8    Displacement of cervical intervertebral disc without myelopathy    Displacement of lumbar intervertebral disc without myelopathy    Primary localized osteoarthrosis, other specified sites    Patient overweight    Lumbosacral spondylosis without myelopathy    Cervical spondylosis without myelopathy    Excess weight    Anxiety    Tardy ulnar nerve palsy    Actinic keratoses    RLS (restless legs syndrome)    Herpes labialis    Pneumonia    Chronic anemia       Current Outpatient Medications   Medication Sig Dispense Refill    oxyCODONE-acetaminophen (PERCOCET) 5-325 MG per tablet Take 1 tablet by mouth daily as needed for Pain for up to 30 days.  30 tablet 0    olmesartan (BENICAR) 40 MG tablet TAKE 1 TABLET BY MOUTH IN THE MORNING 90 tablet 0    naloxone 4 MG/0.1ML LIQD nasal spray 1 spray by Nasal route as needed for Opioid Reversal 1 each 0    omeprazole (PRILOSEC) 40 MG delayed release capsule TAKE 1 CAPSULE DAILY 90 capsule 3    atorvastatin (LIPITOR) 20 MG tablet TAKE 1 TABLET DAILY 90 tablet 3    Multiple Vitamin (MULTI-DAY) TABS Take by mouth      vitamin C (ASCORBIC ACID) 500 MG tablet Take 500 mg by mouth daily      vitamin D (CHOLECALCIFEROL) 1000 UNIT TABS tablet Take 1,000 Units by mouth daily      vitamin E 1000 units capsule Take 1,000 Units by mouth daily      KRILL OIL PO Take 750 mg by mouth daily       Coenzyme Q10 (COQ10 PO) Take 100 mg by mouth daily       calcium carbonate (OSCAL) 500 MG TABS tablet Take 500 mg by mouth daily. diclofenac sodium (VOLTAREN) 1 % GEL Apply 4 g topically in the morning and 4 g at noon and 4 g in the evening and 4 g before bedtime. 150 g 2    aspirin 325 MG EC tablet Take 1 tablet by mouth daily 30 tablet 3     No current facility-administered medications for this visit. Patient's medications, allergies, past medical, surgical, social and family histories were reviewed and updated as appropriate. Review of Systems:   General ROS: negative for - chills, fatigue, fever, malaise, weight gain or weight loss  Respiratory ROS: no cough, shortness of breath, or wheezing  Cardiovascular ROS: no chest pain or dyspnea on exertion  Gastrointestinal ROS: no abdominal pain, change in bowel habits, or black or bloody stools  Genito-Urinary ROS: no dysuria, trouble voiding  Musculoskeletal ROS: per HPI    In general patient otherwise reports feeling well.      Physical Exam:  /64 (Site: Right Upper Arm)   Pulse 85   Temp 98.1 °F (36.7 °C)   Ht 5' 8\" (1.727 m)   Wt 159 lb 11.2 oz (72.4 kg)   SpO2 96%   BMI 24.28 kg/m²     Gen: Well, NAD, Alert, Oriented x 3   HEENT: EOMI, eyes clear, MMM  Skin: no rash or jaundice  Neck: no significant lymphadenopathy or thyromegaly  Lungs: CTA B w/out Rales/Wheezes/Rhonchi, Good respiratory effort   Heart: RRR, S1S2, w/out M/R/G, non-displaced PMI   Psych: reports dysthymic     Lab Results   Component Value Date    WBC 7.6 01/24/2022    HGB 14.2 01/24/2022    HCT 43.1 01/24/2022     01/24/2022    CHOL 161 01/24/2022    TRIG 118 01/24/2022    HDL 59 01/24/2022    ALT 15 01/24/2022    AST 15 01/24/2022     01/24/2022    K 4.3 01/24/2022     01/24/2022    CREATININE 0.90 01/24/2022    BUN 15 01/24/2022    CO2 28 01/24/2022    TSH 0.761 03/18/2019    PSA 0.79 01/24/2022    INR 1.15 (H) 07/17/2018    LABA1C 5.5 03/28/2019         A&P   Diagnosis Orders   1. Hypertension, unspecified type        2. Arthritis        3. Gastroesophageal reflux disease without esophagitis  CBC      4. Hyperlipidemia, unspecified hyperlipidemia type  Lipid Panel    Comprehensive Metabolic Panel      5.  Screening PSA (prostate specific antigen)  PSA Screening            Chronic conditions are stable  Continue current regimen  Follow up with appropriate specialists and here routinely for ongoing monitoring of chronic conditions      Fasting labs          Edy White MD

## 2023-01-30 DIAGNOSIS — E78.5 HYPERLIPIDEMIA, UNSPECIFIED HYPERLIPIDEMIA TYPE: ICD-10-CM

## 2023-01-30 DIAGNOSIS — Z12.5 SCREENING PSA (PROSTATE SPECIFIC ANTIGEN): ICD-10-CM

## 2023-01-30 DIAGNOSIS — K21.9 GASTROESOPHAGEAL REFLUX DISEASE WITHOUT ESOPHAGITIS: ICD-10-CM

## 2023-01-30 LAB
ALBUMIN SERPL-MCNC: 4.4 G/DL (ref 3.5–4.6)
ALP BLD-CCNC: 45 U/L (ref 35–104)
ALT SERPL-CCNC: 12 U/L (ref 0–41)
ANION GAP SERPL CALCULATED.3IONS-SCNC: 8 MEQ/L (ref 9–15)
AST SERPL-CCNC: 25 U/L (ref 0–40)
BILIRUB SERPL-MCNC: <0.2 MG/DL (ref 0.2–0.7)
BUN BLDV-MCNC: 18 MG/DL (ref 8–23)
CALCIUM SERPL-MCNC: 9.4 MG/DL (ref 8.5–9.9)
CHLORIDE BLD-SCNC: 103 MEQ/L (ref 95–107)
CHOLESTEROL, TOTAL: 142 MG/DL (ref 0–199)
CO2: 27 MEQ/L (ref 20–31)
CREAT SERPL-MCNC: 1.03 MG/DL (ref 0.7–1.2)
GFR SERPL CREATININE-BSD FRML MDRD: >60 ML/MIN/{1.73_M2}
GLOBULIN: 2.6 G/DL (ref 2.3–3.5)
GLUCOSE BLD-MCNC: 104 MG/DL (ref 70–99)
HCT VFR BLD CALC: 42.4 % (ref 42–52)
HDLC SERPL-MCNC: 53 MG/DL (ref 40–59)
HEMOGLOBIN: 14.3 G/DL (ref 14–18)
LDL CHOLESTEROL CALCULATED: 75 MG/DL (ref 0–129)
MCH RBC QN AUTO: 30.7 PG (ref 27–31.3)
MCHC RBC AUTO-ENTMCNC: 33.8 % (ref 33–37)
MCV RBC AUTO: 90.7 FL (ref 79–92.2)
PDW BLD-RTO: 14.2 % (ref 11.5–14.5)
PLATELET # BLD: 235 K/UL (ref 130–400)
POTASSIUM SERPL-SCNC: 4.7 MEQ/L (ref 3.4–4.9)
PROSTATE SPECIFIC ANTIGEN: 0.88 NG/ML (ref 0–4)
RBC # BLD: 4.67 M/UL (ref 4.7–6.1)
SODIUM BLD-SCNC: 138 MEQ/L (ref 135–144)
TOTAL PROTEIN: 7 G/DL (ref 6.3–8)
TRIGL SERPL-MCNC: 71 MG/DL (ref 0–150)
WBC # BLD: 6.6 K/UL (ref 4.8–10.8)

## 2023-01-31 DIAGNOSIS — F11.90 CHRONIC, CONTINUOUS USE OF OPIOIDS: ICD-10-CM

## 2023-01-31 DIAGNOSIS — Z51.81 ENCOUNTER FOR MONITORING OPIOID MAINTENANCE THERAPY: ICD-10-CM

## 2023-01-31 DIAGNOSIS — Z79.891 ENCOUNTER FOR MONITORING OPIOID MAINTENANCE THERAPY: ICD-10-CM

## 2023-01-31 DIAGNOSIS — M47.812 CERVICAL SPONDYLOSIS WITHOUT MYELOPATHY: ICD-10-CM

## 2023-01-31 DIAGNOSIS — M47.817 LUMBOSACRAL SPONDYLOSIS WITHOUT MYELOPATHY: ICD-10-CM

## 2023-01-31 DIAGNOSIS — G56.03 BILATERAL CARPAL TUNNEL SYNDROME: ICD-10-CM

## 2023-01-31 RX ORDER — OXYCODONE HYDROCHLORIDE AND ACETAMINOPHEN 5; 325 MG/1; MG/1
1 TABLET ORAL DAILY PRN
Qty: 20 TABLET | Refills: 0 | Status: SHIPPED | OUTPATIENT
Start: 2023-01-31 | End: 2023-02-20

## 2023-01-31 NOTE — TELEPHONE ENCOUNTER
Requested Prescriptions     Pending Prescriptions Disp Refills    oxyCODONE-acetaminophen (PERCOCET) 5-325 MG per tablet 20 tablet 0     Sig: Take 1 tablet by mouth daily as needed for Pain for up to 20 days. Max Daily Amount: 1 tablet       Patient last seen on:  12/22/2022  Date of last surgery:  N/A  Date of last refill:  12/31/22  Pain level:  N/A  Patient complaining of:  PATIENT IS REQUESTING REFILL FOR PERCOCET.  PATIENT WAS SCHEDULED FOR 02/01 WITH  BUT DUE TO SM BEING OUT OF THE OFFICE WE HAD TO R/S  Future appts: 02/20/2023

## 2023-02-18 NOTE — TELEPHONE ENCOUNTER
Comments:     Last Office Visit (last PCP visit):   1/26/2023    Next Visit Date:  Future Appointments   Date Time Provider Sánchez Rivera   2/20/2023  9:30 AM Shirleyann Gowers, MD [facility] EMERGENCY [facility] [place]   7/27/2023  2:00 PM Flakito Rosario MD [facility] [place]       **If hasn't been seen in over a year OR hasn't followed up according to last diabetes/ADHD visit, make appointment for patient before sending refill to provider.     Rx requested:  Requested Prescriptions     Pending Prescriptions Disp Refills    olmesartan (BENICAR) 40 MG tablet [Pharmacy Med Name: OLMESARTAN MEDOXOMIL 40MG TABLETS] 90 tablet 0     Sig: TAKE 1 TABLET BY MOUTH IN THE MORNING

## 2023-02-20 ENCOUNTER — OFFICE VISIT (OUTPATIENT)
Dept: PAIN MANAGEMENT | Age: 78
End: 2023-02-20
Payer: MEDICARE

## 2023-02-20 VITALS
DIASTOLIC BLOOD PRESSURE: 84 MMHG | BODY MASS INDEX: 27.28 KG/M2 | TEMPERATURE: 97.4 F | HEIGHT: 68 IN | WEIGHT: 180 LBS | SYSTOLIC BLOOD PRESSURE: 132 MMHG

## 2023-02-20 DIAGNOSIS — M47.817 LUMBOSACRAL SPONDYLOSIS WITHOUT MYELOPATHY: ICD-10-CM

## 2023-02-20 DIAGNOSIS — G89.29 CHRONIC RIGHT SHOULDER PAIN: Primary | ICD-10-CM

## 2023-02-20 DIAGNOSIS — M25.511 CHRONIC RIGHT SHOULDER PAIN: Primary | ICD-10-CM

## 2023-02-20 DIAGNOSIS — G56.03 BILATERAL CARPAL TUNNEL SYNDROME: ICD-10-CM

## 2023-02-20 DIAGNOSIS — Z51.81 ENCOUNTER FOR MONITORING OPIOID MAINTENANCE THERAPY: ICD-10-CM

## 2023-02-20 DIAGNOSIS — F11.90 CHRONIC, CONTINUOUS USE OF OPIOIDS: ICD-10-CM

## 2023-02-20 DIAGNOSIS — Z79.891 ENCOUNTER FOR MONITORING OPIOID MAINTENANCE THERAPY: ICD-10-CM

## 2023-02-20 DIAGNOSIS — F11.20 OPIOID DEPENDENCE WITH CURRENT USE (HCC): ICD-10-CM

## 2023-02-20 DIAGNOSIS — M47.812 CERVICAL SPONDYLOSIS WITHOUT MYELOPATHY: ICD-10-CM

## 2023-02-20 PROCEDURE — 4004F PT TOBACCO SCREEN RCVD TLK: CPT | Performed by: PHYSICAL MEDICINE & REHABILITATION

## 2023-02-20 PROCEDURE — 3017F COLORECTAL CA SCREEN DOC REV: CPT | Performed by: PHYSICAL MEDICINE & REHABILITATION

## 2023-02-20 PROCEDURE — 1123F ACP DISCUSS/DSCN MKR DOCD: CPT | Performed by: PHYSICAL MEDICINE & REHABILITATION

## 2023-02-20 PROCEDURE — G8417 CALC BMI ABV UP PARAM F/U: HCPCS | Performed by: PHYSICAL MEDICINE & REHABILITATION

## 2023-02-20 PROCEDURE — G8484 FLU IMMUNIZE NO ADMIN: HCPCS | Performed by: PHYSICAL MEDICINE & REHABILITATION

## 2023-02-20 PROCEDURE — 3075F SYST BP GE 130 - 139MM HG: CPT | Performed by: PHYSICAL MEDICINE & REHABILITATION

## 2023-02-20 PROCEDURE — 3079F DIAST BP 80-89 MM HG: CPT | Performed by: PHYSICAL MEDICINE & REHABILITATION

## 2023-02-20 PROCEDURE — 99214 OFFICE O/P EST MOD 30 MIN: CPT | Performed by: PHYSICAL MEDICINE & REHABILITATION

## 2023-02-20 PROCEDURE — G8427 DOCREV CUR MEDS BY ELIG CLIN: HCPCS | Performed by: PHYSICAL MEDICINE & REHABILITATION

## 2023-02-20 RX ORDER — OXYCODONE HYDROCHLORIDE AND ACETAMINOPHEN 5; 325 MG/1; MG/1
1 TABLET ORAL DAILY PRN
Qty: 30 TABLET | Refills: 0 | Status: SHIPPED | OUTPATIENT
Start: 2023-02-20 | End: 2023-03-22

## 2023-02-20 RX ORDER — OLMESARTAN MEDOXOMIL 40 MG/1
40 TABLET ORAL DAILY
Qty: 90 TABLET | Refills: 0 | Status: SHIPPED | OUTPATIENT
Start: 2023-02-20

## 2023-02-20 ASSESSMENT — ENCOUNTER SYMPTOMS
BACK PAIN: 1
SHORTNESS OF BREATH: 0
DIARRHEA: 0
NAUSEA: 0
CONSTIPATION: 0
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
## 2023-02-21 ENCOUNTER — TELEPHONE (OUTPATIENT)
Dept: PAIN MANAGEMENT | Age: 78
End: 2023-02-21

## 2023-02-21 DIAGNOSIS — M25.511 CHRONIC RIGHT SHOULDER PAIN: ICD-10-CM

## 2023-02-21 DIAGNOSIS — G89.29 CHRONIC RIGHT SHOULDER PAIN: ICD-10-CM

## 2023-02-21 NOTE — TELEPHONE ENCOUNTER
RIGHT SHOULDER GLENOHUMERAL JOINT INJECTION UNDER XR    NO AUTH REQUIRED    OK to schedule procedure approved as above. Please note sides/levels approved and date range. (If applicable, sides/levels approved may differ from those ordered)    TO BE SCHEDULED WITH DR. Abhijeet Dennis

## 2023-02-22 NOTE — TELEPHONE ENCOUNTER
Left voicemail for the patient to schedule procedure    (SM-RIGHT SHOULDER GLENOHUMERAL JOINT INJECTION UNDER XR- NO AUTH REQUIRED

## 2023-03-15 ENCOUNTER — TELEPHONE (OUTPATIENT)
Dept: GASTROENTEROLOGY | Age: 78
End: 2023-03-15

## 2023-03-20 ENCOUNTER — TELEPHONE (OUTPATIENT)
Dept: FAMILY MEDICINE CLINIC | Age: 78
End: 2023-03-20

## 2023-03-21 ENCOUNTER — TELEMEDICINE (OUTPATIENT)
Dept: FAMILY MEDICINE CLINIC | Age: 78
End: 2023-03-21
Payer: MEDICARE

## 2023-03-21 DIAGNOSIS — Z00.00 INITIAL MEDICARE ANNUAL WELLNESS VISIT: Primary | ICD-10-CM

## 2023-03-21 PROCEDURE — G8484 FLU IMMUNIZE NO ADMIN: HCPCS | Performed by: NURSE PRACTITIONER

## 2023-03-21 PROCEDURE — 1123F ACP DISCUSS/DSCN MKR DOCD: CPT | Performed by: NURSE PRACTITIONER

## 2023-03-21 PROCEDURE — G0438 PPPS, INITIAL VISIT: HCPCS | Performed by: NURSE PRACTITIONER

## 2023-03-21 PROCEDURE — 3017F COLORECTAL CA SCREEN DOC REV: CPT | Performed by: NURSE PRACTITIONER

## 2023-03-21 ASSESSMENT — PATIENT HEALTH QUESTIONNAIRE - PHQ9
SUM OF ALL RESPONSES TO PHQ QUESTIONS 1-9: 2
2. FEELING DOWN, DEPRESSED OR HOPELESS: 1
SUM OF ALL RESPONSES TO PHQ QUESTIONS 1-9: 2
SUM OF ALL RESPONSES TO PHQ QUESTIONS 1-9: 2
1. LITTLE INTEREST OR PLEASURE IN DOING THINGS: 1
SUM OF ALL RESPONSES TO PHQ QUESTIONS 1-9: 2
SUM OF ALL RESPONSES TO PHQ9 QUESTIONS 1 & 2: 2

## 2023-03-21 ASSESSMENT — LIFESTYLE VARIABLES
HOW MANY STANDARD DRINKS CONTAINING ALCOHOL DO YOU HAVE ON A TYPICAL DAY: 1 OR 2
HOW OFTEN DO YOU HAVE A DRINK CONTAINING ALCOHOL: 2-4 TIMES A MONTH

## 2023-03-21 NOTE — PATIENT INSTRUCTIONS
professional. [address] any warranty or liability for your use of this information. A Healthy Heart: Care Instructions  Your Care Instructions     Coronary artery disease, also called heart disease, occurs when a substance called plaque builds up in the vessels that supply oxygen-rich blood to your heart muscle. This can narrow the blood vessels and reduce blood flow. A heart attack happens when blood flow is completely blocked. A high-fat diet, smoking, and other factors increase the risk of heart disease. Your doctor has found that you have a chance of having heart disease. You can do lots of things to keep your heart healthy. It may not be easy, but you can change your diet, exercise more, and quit smoking. These steps really work to lower your chance of heart disease. Follow-up care is a key part of your treatment and safety. Be sure to make and go to all appointments, and call your doctor if you are having problems. It's also a good idea to know your test results and keep a list of the medicines you take. How can you care for yourself at home? Diet    Use less salt when you cook and eat. This helps lower your blood pressure. Taste food before salting. Add only a little salt when you think you need it. With time, your taste buds will adjust to less salt.     Eat fewer snack items, fast foods, canned soups, and other high-salt, high-fat, processed foods.     Read food labels and try to avoid saturated and trans fats. They increase your risk of heart disease by raising cholesterol levels.     Limit the amount of solid fat-butter, margarine, and shortening-you eat. Use olive, peanut, or canola oil when you cook. Bake, broil, and steam foods instead of frying them.     Eat a variety of fruit and vegetables every day. Dark green, deep orange, red, or yellow fruits and vegetables are especially good for you.  Examples include spinach, carrots, peaches, and berries.     Foods high

## 2023-03-21 NOTE — PROGRESS NOTES
Smoking Tobacco Use: Some Days    Smokeless Tobacco Use: Former    Passive Exposure: Not on file     E-cigarette/Vaping       Questions Responses    E-cigarette/Vaping Use     Start Date     Passive Exposure     Quit Date     Counseling Given     Comments           Interventions:  Patient declined any further intervention or treatment                        Objective      Patient-Reported Vitals  BP Observations: No, remote/electronic monitoring device was not used or able to be verified  Patient-Reported Weight: 180 lbs  Patient-Reported Height: 5ft8       No physical exam completed today as pt and provider completed VV today. Allergies   Allergen Reactions    Flexeril [Cyclobenzaprine] Other (See Comments)     Becomes estrada    Lyrica [Pregabalin]     Gabapentin Swelling     Funny feeling    Topamax [Topiramate] Other (See Comments)     Funny feeling    Voltaren [Diclofenac] Nausea Only     Prior to Visit Medications    Medication Sig Taking? Authorizing Provider   olmesartan (BENICAR) 40 MG tablet TAKE 1 TABLET BY MOUTH IN THE MORNING Yes Anastasiia Hicks MD   oxyCODONE-acetaminophen (PERCOCET) 5-325 MG per tablet Take 1 tablet by mouth daily as needed for Pain for up to 30 days.  Yes Cheng Echeverria MD   naloxone 4 MG/0.1ML LIQD nasal spray 1 spray by Nasal route as needed for Opioid Reversal Yes Cheng Echeverria MD   omeprazole (PRILOSEC) 40 MG delayed release capsule TAKE 1 CAPSULE DAILY Yes Anastasiia Hicks MD   atorvastatin (LIPITOR) 20 MG tablet TAKE 1 TABLET DAILY Yes Anastasiia Hicks MD   Multiple Vitamin (MULTI-DAY) TABS Take by mouth Yes Historical Provider, MD   vitamin C (ASCORBIC ACID) 500 MG tablet Take 500 mg by mouth daily Yes Historical Provider, MD   vitamin D (CHOLECALCIFEROL) 1000 UNIT TABS tablet Take 1,000 Units by mouth daily Yes Historical Provider, MD   vitamin E 1000 units capsule Take 1,000 Units by mouth daily Yes Historical Provider, MD   KRILL OIL PO Take 750 mg by mouth daily  Yes Historical

## 2023-03-23 ENCOUNTER — PROCEDURE VISIT (OUTPATIENT)
Dept: PAIN MANAGEMENT | Age: 78
End: 2023-03-23

## 2023-03-23 DIAGNOSIS — G89.29 CHRONIC RIGHT SHOULDER PAIN: Primary | ICD-10-CM

## 2023-03-23 DIAGNOSIS — M25.511 CHRONIC RIGHT SHOULDER PAIN: Primary | ICD-10-CM

## 2023-03-23 RX ORDER — BETAMETHASONE SODIUM PHOSPHATE AND BETAMETHASONE ACETATE 3; 3 MG/ML; MG/ML
3 INJECTION, SUSPENSION INTRA-ARTICULAR; INTRALESIONAL; INTRAMUSCULAR; SOFT TISSUE ONCE
Status: COMPLETED | OUTPATIENT
Start: 2023-03-23 | End: 2023-03-23

## 2023-03-23 RX ORDER — LIDOCAINE HYDROCHLORIDE 10 MG/ML
6 INJECTION, SOLUTION INFILTRATION; PERINEURAL ONCE
Status: COMPLETED | OUTPATIENT
Start: 2023-03-23 | End: 2023-03-23

## 2023-03-23 RX ADMIN — LIDOCAINE HYDROCHLORIDE 6 ML: 10 INJECTION, SOLUTION INFILTRATION; PERINEURAL at 15:20

## 2023-03-23 RX ADMIN — Medication 0.5 MEQ: at 15:19

## 2023-03-23 RX ADMIN — BETAMETHASONE SODIUM PHOSPHATE AND BETAMETHASONE ACETATE 3 MG: 3; 3 INJECTION, SUSPENSION INTRA-ARTICULAR; INTRALESIONAL; INTRAMUSCULAR; SOFT TISSUE at 15:20

## 2023-03-23 NOTE — PROGRESS NOTES
Shoulder Glenohumeral Joint Injection           Patient Name: Negin Ortiz   : 1945  Date: 3/23/2023     Provider: Saskia Conrell MD        PROCEDURE: Right glenohumeral joint shoulder corticosteroid injection under fluoroscopic guidance    INDICATIONS: Negin Ortiz is a 78 y.o. male who presents with symptoms and physical exam findings consistent with chronic right shoulder pain. He has had persistent pain that limits his activities of daily living such as upper body dressing. The pain is persistent despite conservative measures including home exercise program. Given his symptoms, physical exam findings, impairment in activities of daily living, and lack of response to conservative measures, consideration for Right glenohumeral joint shoulder corticosteroid injection under fluoroscopic guidance was given. Discussed the risks including but not limited to bleeding, infection, worsened pain, damage to surrounding structures, side effects, toxicity, allergic reactions to medications used, immune and stress-response dysfunction, fat necrosis, decreased bone mineralization, cartilage loss, increased fracture risk, avascular necrosis, skin pigmentation changes, blood sugar elevation, need for surgery, premature damage or degeneration of the joint, pneumothorax, as well as catastrophic injury such as vision loss, paralysis, spinal cord or plexus injury, stroke, bowel or bladder incontinence, ventilator dependence, loss of use of the joint and/or extremity, and death. Discussed his elevated risk given anticoagulation status and the formation of hematomas, uncontrolled bleeding, hypotension, and death. Discussed the risks, benefits, alternative procedures, and alternatives to the procedure including no procedure at all. Discussed that we cannot undo any permanent neurologic or orthopaedic damage or change the course of any underlying disease.  After thorough discussion, patient expressed understanding and

## 2023-03-27 DIAGNOSIS — Z79.891 ENCOUNTER FOR MONITORING OPIOID MAINTENANCE THERAPY: ICD-10-CM

## 2023-03-27 DIAGNOSIS — M47.817 LUMBOSACRAL SPONDYLOSIS WITHOUT MYELOPATHY: ICD-10-CM

## 2023-03-27 DIAGNOSIS — M47.812 CERVICAL SPONDYLOSIS WITHOUT MYELOPATHY: ICD-10-CM

## 2023-03-27 DIAGNOSIS — F11.90 CHRONIC, CONTINUOUS USE OF OPIOIDS: ICD-10-CM

## 2023-03-27 DIAGNOSIS — Z51.81 ENCOUNTER FOR MONITORING OPIOID MAINTENANCE THERAPY: ICD-10-CM

## 2023-03-27 DIAGNOSIS — G56.03 BILATERAL CARPAL TUNNEL SYNDROME: ICD-10-CM

## 2023-03-27 RX ORDER — OXYCODONE HYDROCHLORIDE AND ACETAMINOPHEN 5; 325 MG/1; MG/1
1 TABLET ORAL DAILY PRN
Qty: 30 TABLET | Refills: 0 | Status: CANCELLED | OUTPATIENT
Start: 2023-03-27 | End: 2023-04-26

## 2023-03-27 NOTE — TELEPHONE ENCOUNTER
Requested Prescriptions     Pending Prescriptions Disp Refills    oxyCODONE-acetaminophen (PERCOCET) 5-325 MG per tablet 30 tablet 0     Sig: Take 1 tablet by mouth daily as needed for Pain for up to 30 days. Patient last seen on:  3/23  Date of last surgery:  n/a  Date of last refill:  2/20  Pain level:  n/a  Patient complaining of:  PT had procedure the other day but his medication was not sent in, he is asking if it can be sent in?    Future appts: none

## 2023-03-28 ASSESSMENT — ENCOUNTER SYMPTOMS
NAUSEA: 0
BACK PAIN: 1
CONSTIPATION: 0
SHORTNESS OF BREATH: 0
DIARRHEA: 0

## 2023-04-19 ENCOUNTER — OFFICE VISIT (OUTPATIENT)
Dept: PAIN MANAGEMENT | Age: 78
End: 2023-04-19

## 2023-04-19 VITALS
SYSTOLIC BLOOD PRESSURE: 126 MMHG | HEIGHT: 68 IN | WEIGHT: 180 LBS | DIASTOLIC BLOOD PRESSURE: 72 MMHG | TEMPERATURE: 97.1 F | BODY MASS INDEX: 27.28 KG/M2

## 2023-04-19 DIAGNOSIS — G89.29 CHRONIC RIGHT SHOULDER PAIN: Primary | ICD-10-CM

## 2023-04-19 DIAGNOSIS — M47.817 LUMBOSACRAL SPONDYLOSIS WITHOUT MYELOPATHY: ICD-10-CM

## 2023-04-19 DIAGNOSIS — Z79.891 ENCOUNTER FOR MONITORING OPIOID MAINTENANCE THERAPY: ICD-10-CM

## 2023-04-19 DIAGNOSIS — G56.03 BILATERAL CARPAL TUNNEL SYNDROME: ICD-10-CM

## 2023-04-19 DIAGNOSIS — F11.90 CHRONIC, CONTINUOUS USE OF OPIOIDS: ICD-10-CM

## 2023-04-19 DIAGNOSIS — M47.812 CERVICAL SPONDYLOSIS WITHOUT MYELOPATHY: ICD-10-CM

## 2023-04-19 DIAGNOSIS — Z51.81 ENCOUNTER FOR MONITORING OPIOID MAINTENANCE THERAPY: ICD-10-CM

## 2023-04-19 DIAGNOSIS — M25.511 CHRONIC RIGHT SHOULDER PAIN: Primary | ICD-10-CM

## 2023-04-19 RX ORDER — OXYCODONE HYDROCHLORIDE AND ACETAMINOPHEN 5; 325 MG/1; MG/1
1 TABLET ORAL DAILY PRN
Qty: 30 TABLET | Refills: 0 | Status: CANCELLED | OUTPATIENT
Start: 2023-04-27 | End: 2023-05-27

## 2023-04-19 RX ORDER — METHYLPREDNISOLONE 4 MG/1
TABLET ORAL
Qty: 1 KIT | Refills: 0 | Status: SHIPPED | OUTPATIENT
Start: 2023-04-19 | End: 2023-04-25

## 2023-04-19 ASSESSMENT — ENCOUNTER SYMPTOMS
CONSTIPATION: 0
BACK PAIN: 1
COUGH: 0
EYES NEGATIVE: 1
NAUSEA: 0
SHORTNESS OF BREATH: 0
GASTROINTESTINAL NEGATIVE: 1
DIARRHEA: 0

## 2023-04-19 NOTE — PROGRESS NOTES
Requested:   1     Discussed options with the patient today. Anatomic model pathology was shown and reviewed with pt. Pt became quite angry when discussing medication when this NP suggested discontinuing Percocet as patient clearly said it did not offer any relief. He says \"I am not just wanting percocet, I want my pain to be fixed\". He states that Tylenol and ibuprofen 3 of each helped better than the Percocet. Cannot justify continuing Percocet if is not offering any relief. Discussed with patient that we should be having calm conversations and respectful conversations and patient was quite angry when this NP was asking calm questions. He did calm down and was able to discuss his pain and plan. Will discontinue percocet d/t lack of relief. Will start trial of medrol dose pack to take as directed - do not use with NSAIDS. Will refer to Dr. Michi Sr for continued Rt shoulder pain despite injection. Discussed with pt he needs to start PT for Rt shoulder as Dr. Janneth Lopes previously ordered as he likely needs Rt shoulder MRI d/t pain. Can order MRI of Rt shoulder today to evaluate further. Discussed plan with Dr. Janneth Lopes and he agreed with above plan. He suggests no narcotics d/t lack of any relief. All questions were answered. Discussed home exercise program.  Relevant imaging and pain generators reviewed. Pt verbalized understanding and agrees with above plan. Pt has chronic pain. Utox reviewed and appropriate from February 2023. Opioid risk tool score 0, low risk. Narcan Rx sent July 2022. Will discontinue medications for chronic pain as it is not helping him function with ADL or improve quality of life. OARRS was reviewed. Follow up:  Return in about 2 weeks (around 5/3/2023) for review meds and reassess pain, F/U Dr. Janneth Lopes.     Prince Collier, APRN - CNP

## 2023-05-08 ENCOUNTER — OFFICE VISIT (OUTPATIENT)
Dept: PAIN MANAGEMENT | Age: 78
End: 2023-05-08
Payer: MEDICARE

## 2023-05-08 VITALS
HEIGHT: 68 IN | BODY MASS INDEX: 27.28 KG/M2 | DIASTOLIC BLOOD PRESSURE: 72 MMHG | TEMPERATURE: 98.1 F | SYSTOLIC BLOOD PRESSURE: 114 MMHG | WEIGHT: 180 LBS

## 2023-05-08 DIAGNOSIS — G56.03 BILATERAL CARPAL TUNNEL SYNDROME: ICD-10-CM

## 2023-05-08 DIAGNOSIS — M54.16 LUMBAR RADICULOPATHY: ICD-10-CM

## 2023-05-08 DIAGNOSIS — M47.812 CERVICAL SPONDYLOSIS WITHOUT MYELOPATHY: ICD-10-CM

## 2023-05-08 DIAGNOSIS — M25.811 IMPINGEMENT OF RIGHT SHOULDER: Primary | ICD-10-CM

## 2023-05-08 PROCEDURE — 1123F ACP DISCUSS/DSCN MKR DOCD: CPT | Performed by: PHYSICAL MEDICINE & REHABILITATION

## 2023-05-08 PROCEDURE — G8417 CALC BMI ABV UP PARAM F/U: HCPCS | Performed by: PHYSICAL MEDICINE & REHABILITATION

## 2023-05-08 PROCEDURE — 99214 OFFICE O/P EST MOD 30 MIN: CPT | Performed by: PHYSICAL MEDICINE & REHABILITATION

## 2023-05-08 PROCEDURE — 3078F DIAST BP <80 MM HG: CPT | Performed by: PHYSICAL MEDICINE & REHABILITATION

## 2023-05-08 PROCEDURE — 4004F PT TOBACCO SCREEN RCVD TLK: CPT | Performed by: PHYSICAL MEDICINE & REHABILITATION

## 2023-05-08 PROCEDURE — 3074F SYST BP LT 130 MM HG: CPT | Performed by: PHYSICAL MEDICINE & REHABILITATION

## 2023-05-08 PROCEDURE — 3017F COLORECTAL CA SCREEN DOC REV: CPT | Performed by: PHYSICAL MEDICINE & REHABILITATION

## 2023-05-08 PROCEDURE — G8427 DOCREV CUR MEDS BY ELIG CLIN: HCPCS | Performed by: PHYSICAL MEDICINE & REHABILITATION

## 2023-05-08 ASSESSMENT — ENCOUNTER SYMPTOMS
CONSTIPATION: 0
SHORTNESS OF BREATH: 0
NAUSEA: 0
BACK PAIN: 1
DIARRHEA: 0

## 2023-05-08 NOTE — PROGRESS NOTES
procedure. Consider 3 mg betamethasone.  -Consider right lumbar l5/S1 TFESI if his right leg pain persists after MRI imaging review    Controlled Substance Monitoring:    Acute and Chronic Pain Monitoring:   RX Monitoring 5/8/2023   Attestation -   Periodic Controlled Substance Monitoring Assessed functional status. Discussed the risks, side effects, and symptoms that would warrant urgent or emergent physician evaluation of all medications prescribed today. Discussed the risks of the above recommended procedures including but not limited to bleeding, infection, worsened pain, damage to surrounding structures, side effects, toxicity, allergic reactions to medications used, immune and stress-response dysfunction, fat necrosis, decreased bone mineralization, cartilage loss, increased fracture risk, avascular necrosis, skin pigmentation changes, blood sugar elevation, need for surgery, premature damage or degeneration of the joint, as well as catastrophic injury such as vision loss, paralysis, stroke, bowel or bladder incontinence, ventilator dependence, loss of use of the joint and/or extremity, and death. Discussed the risks, benefits, alternative procedures, and alternatives to the procedure including no procedure at all. Discussed that we cannot undo any permanent neurologic or orthopaedic damage or change the course of any underlying disease. The patient appears to be a good candidate for the above recommended procedures, but no guarantees expressed or implied are given regarding the outcome of any procedure. After thorough discussion, patient expressed complete understanding and willingness to proceed.      Discussed the risks of the above recommended procedures including but not limited to bleeding, infection, worsened pain, damage to surrounding structures, side effects, toxicity, allergic reactions to medications used, immune and stress-response dysfunction, fat necrosis, skin pigmentation

## 2023-05-18 RX ORDER — OLMESARTAN MEDOXOMIL 40 MG/1
TABLET ORAL
Qty: 90 TABLET | Refills: 0 | Status: SHIPPED | OUTPATIENT
Start: 2023-05-18

## 2023-05-18 NOTE — TELEPHONE ENCOUNTER
Comments:     Last Office Visit (last PCP visit):   1/26/2023    Next Visit Date:  Future Appointments   Date Time Provider Sánchez Rivera   7/27/2023  2:00 PM Ben Simons MD [facility]       **If hasn't been seen in over a year OR hasn't followed up according to last diabetes/ADHD visit, make appointment for patient before sending refill to provider.     Rx requested:  Requested Prescriptions     Pending Prescriptions Disp Refills    olmesartan (BENICAR) 40 MG tablet [Pharmacy Med Name: OLMESARTAN MEDOXOMIL 40MG TABLETS] 90 tablet 0     Sig: TAKE 1 TABLET BY MOUTH IN THE MORNING

## 2023-08-04 ENCOUNTER — TELEPHONE (OUTPATIENT)
Dept: FAMILY MEDICINE CLINIC | Age: 78
End: 2023-08-04

## 2023-08-04 NOTE — TELEPHONE ENCOUNTER
----- Message from Callum Asher sent at 8/4/2023  8:46 AM EDT -----  Subject: Message to Provider    QUESTIONS  Information for Provider? Pt states he is trying to find a good   Physiatrist for his son. his son is not a current Dr. Bentley Robles Pt he is just   looking for recommendations. he is currently in rehab. He was hoping Dr. Bentley Robles knew someone he could give him a few names or recommendations he   could refer his son too. He is needing some recommendations for Monday so   his son can get out of rehab   ---------------------------------------------------------------------------  --------------  [address]  [phone]; OK to leave message on voicemail  ---------------------------------------------------------------------------  --------------  SCRIPT ANSWERS  Relationship to Patient?  Self

## 2023-10-17 ENCOUNTER — TELEPHONE (OUTPATIENT)
Dept: PHARMACY | Facility: CLINIC | Age: 78
End: 2023-10-17

## 2023-10-17 NOTE — TELEPHONE ENCOUNTER
[facility] [place] [facility]: ADHERENCE REVIEW  Identified care gap per [employer]: fills at [facility]: ACE/ARB and Statin adherence      ASSESSMENT    ACE/ARB ADHERENCE    Insurance Records claims through 10/09/2023 (Prior Year [address] [place] [address] = not reported; [address] = 78%; Potential Fail Date: 10/20/23):   OLMESA MEDOX TAB 40MG last filled on 23 for 85 day supply. Next refill due: 23    Prescribed si tablet/capsule daily    Per Reconcile Dispense History: last filled on 23 for 85 day supply. Per [facility]: last picked up on 23 for 90 day supply. 0 refills remaining. Pharmacy stated they tried to request a refill in September but never heard back. Patient most likely needs to make appointment since last one in January. BP Readings from Last 3 Encounters:   23 114/72   23 126/72   23 132/84     CrCl cannot be calculated (Patient's most recent lab result is older than the maximum 180 days allowed. ). Lab Results   Component Value Date    CREATININE 1.03 2023     Lab Results   Component Value Date    K 4.7 2023     STATIN ADHERENCE    Insurance Records claims through 10/09/2023 (Prior Year [address] [place] [address] = not reported; [address] = 100% - PASSED):   ATORVASTATIN TAB 20MG last filled on 23 for 90 day supply. Next refill due: 23    Prescribed si tablet/capsule daily    Per Reconcile Dispense History: last filled on 23 for 90 day supply. PLAN    The following are interventions that have been identified:   Patient overdue refilling Olmesartan and active on home medication list.   Patient needs refills for Olmesartan  Patient needs appointment with provider. Attempting to reach patient to review. Left message asking for return call. Letter sent to patient.       Last Visit: 23  Next Visit: Neville Mendez, [address]   Direct: [phone]  Phone: toll

## 2023-10-19 ENCOUNTER — OFFICE VISIT (OUTPATIENT)
Dept: FAMILY MEDICINE CLINIC | Age: 78
End: 2023-10-19
Payer: MEDICARE

## 2023-10-19 VITALS
BODY MASS INDEX: 24.86 KG/M2 | OXYGEN SATURATION: 96 % | TEMPERATURE: 97.4 F | SYSTOLIC BLOOD PRESSURE: 120 MMHG | WEIGHT: 164 LBS | HEIGHT: 68 IN | DIASTOLIC BLOOD PRESSURE: 62 MMHG | HEART RATE: 56 BPM

## 2023-10-19 DIAGNOSIS — I10 HYPERTENSION, UNSPECIFIED TYPE: ICD-10-CM

## 2023-10-19 DIAGNOSIS — E78.5 HYPERLIPIDEMIA, UNSPECIFIED HYPERLIPIDEMIA TYPE: Primary | ICD-10-CM

## 2023-10-19 DIAGNOSIS — K21.9 GASTROESOPHAGEAL REFLUX DISEASE WITHOUT ESOPHAGITIS: ICD-10-CM

## 2023-10-19 PROCEDURE — 3074F SYST BP LT 130 MM HG: CPT | Performed by: FAMILY MEDICINE

## 2023-10-19 PROCEDURE — 99213 OFFICE O/P EST LOW 20 MIN: CPT | Performed by: FAMILY MEDICINE

## 2023-10-19 PROCEDURE — G8420 CALC BMI NORM PARAMETERS: HCPCS | Performed by: FAMILY MEDICINE

## 2023-10-19 PROCEDURE — 3078F DIAST BP <80 MM HG: CPT | Performed by: FAMILY MEDICINE

## 2023-10-19 PROCEDURE — G8484 FLU IMMUNIZE NO ADMIN: HCPCS | Performed by: FAMILY MEDICINE

## 2023-10-19 PROCEDURE — 4004F PT TOBACCO SCREEN RCVD TLK: CPT | Performed by: FAMILY MEDICINE

## 2023-10-19 PROCEDURE — 1123F ACP DISCUSS/DSCN MKR DOCD: CPT | Performed by: FAMILY MEDICINE

## 2023-10-19 PROCEDURE — G8427 DOCREV CUR MEDS BY ELIG CLIN: HCPCS | Performed by: FAMILY MEDICINE

## 2023-10-19 RX ORDER — OLMESARTAN MEDOXOMIL 40 MG/1
40 TABLET ORAL EVERY MORNING
Qty: 90 TABLET | Refills: 3 | Status: SHIPPED | OUTPATIENT
Start: 2023-10-19

## 2023-10-19 RX ORDER — OMEPRAZOLE 40 MG/1
40 CAPSULE, DELAYED RELEASE ORAL DAILY
Qty: 90 CAPSULE | Refills: 3 | Status: SHIPPED | OUTPATIENT
Start: 2023-10-19

## 2023-10-19 RX ORDER — OLMESARTAN MEDOXOMIL 40 MG/1
40 TABLET ORAL EVERY MORNING
Qty: 90 TABLET | Refills: 0 | Status: CANCELLED | OUTPATIENT
Start: 2023-10-19

## 2023-10-19 SDOH — ECONOMIC STABILITY: FOOD INSECURITY: WITHIN THE PAST 12 MONTHS, YOU WORRIED THAT YOUR FOOD WOULD RUN OUT BEFORE YOU GOT MONEY TO BUY MORE.: NEVER TRUE

## 2023-10-19 SDOH — ECONOMIC STABILITY: INCOME INSECURITY: HOW HARD IS IT FOR YOU TO PAY FOR THE VERY BASICS LIKE FOOD, HOUSING, MEDICAL CARE, AND HEATING?: NOT HARD AT ALL

## 2023-10-19 SDOH — ECONOMIC STABILITY: HOUSING INSECURITY
IN THE LAST 12 MONTHS, WAS THERE A TIME WHEN YOU DID NOT HAVE A STEADY PLACE TO SLEEP OR SLEPT IN A SHELTER (INCLUDING NOW)?: NO

## 2023-10-19 SDOH — ECONOMIC STABILITY: FOOD INSECURITY: WITHIN THE PAST 12 MONTHS, THE FOOD YOU BOUGHT JUST DIDN'T LAST AND YOU DIDN'T HAVE MONEY TO GET MORE.: NEVER TRUE

## 2023-10-19 NOTE — PROGRESS NOTES
Chief Complaint   Patient presents with    Hypertension     6 month      Uziel Mix is a 70 y.o. male    hyperlipid  Ok on lipitor    Sleeps ok with tylenol and ibuprofen     Ongoing pain complaints  Hands stiff/hurt  Seeing rheumatology     Arthritis pain, worse with cold weather     Had previous surgery on his left arm  Having pain/locks up when he reaches behind    Issues with gabapentin and lyrica in past (intolerance)    Wt Readings from Last 3 Encounters:   10/19/23 74.4 kg (164 lb)   05/08/23 81.6 kg (180 lb)   04/19/23 81.6 kg (180 lb)         Patient Active Problem List   Diagnosis    GERD (gastroesophageal reflux disease)    Hyperlipidemia    Hypertension    Chronic nasal congestion    Prostatitis    Low back pain    Hand pain    High risk medication use - OARRS PM&R 5/9/17, 07/12/17 OARRS PM&R, 01/24/17 Med Contract PM&R, 07/13/17 Urine Drug Screen: negative PM&R    DDD (degenerative disc disease), lumbar    Myalgia    Sciatic pain    Greater trochanteric bursitis    C7 radiculopathy    Bilateral carpal tunnel syndrome    Ulnar neuropathy of left upper extremity    Lateral epicondylitis (tennis elbow)    Cervical radiculopathy at C8    Displacement of cervical intervertebral disc without myelopathy    Displacement of lumbar intervertebral disc without myelopathy    Primary localized osteoarthrosis, other specified sites    Patient overweight    Lumbosacral spondylosis without myelopathy    Cervical spondylosis without myelopathy    Excess weight    Anxiety    Tardy ulnar nerve palsy    Actinic keratoses    RLS (restless legs syndrome)    Herpes labialis    Pneumonia    Chronic anemia    Opioid dependence with current use (HCC)       Current Outpatient Medications   Medication Sig Dispense Refill    omeprazole (PRILOSEC) 40 MG delayed release capsule Take 1 capsule by mouth daily 90 capsule 3    olmesartan (BENICAR) 40 MG tablet Take 1 tablet by mouth every morning 90 tablet 3    diclofenac sodium

## 2023-10-19 NOTE — TELEPHONE ENCOUNTER
----- Message from [facility] sent at 10/18/2023  3:01 PM EDT -----  Subject: Refill Request    QUESTIONS  Name of Medication? olmesartan (BENICAR) 40 MG tablet  Patient-reported dosage and instructions? 40MG take one tablet by mouth in   the morning  How many days do you have left? 0  Preferred Pharmacy? [id number]  Pharmacy phone number (if available)? [phone]  Additional Information for Provider? Pt's next appt f/u is 10/19.  ---------------------------------------------------------------------------  --------------  CALL BACK INFO  What is the best way for the office to contact you? OK to leave message on   voicemail  Preferred Call Back Phone Number? [phone]  ---------------------------------------------------------------------------  --------------  SCRIPT ANSWERS  Relationship to Patient?  Self

## 2024-02-08 DIAGNOSIS — E78.5 HYPERLIPIDEMIA, UNSPECIFIED HYPERLIPIDEMIA TYPE: ICD-10-CM

## 2024-02-08 DIAGNOSIS — K21.9 GASTROESOPHAGEAL REFLUX DISEASE WITHOUT ESOPHAGITIS: ICD-10-CM

## 2024-02-09 RX ORDER — OMEPRAZOLE 40 MG/1
40 CAPSULE, DELAYED RELEASE ORAL DAILY
Qty: 90 CAPSULE | Refills: 3 | Status: SHIPPED | OUTPATIENT
Start: 2024-02-09

## 2024-02-09 RX ORDER — ATORVASTATIN CALCIUM 20 MG/1
20 TABLET, FILM COATED ORAL DAILY
Qty: 90 TABLET | Refills: 3 | Status: SHIPPED | OUTPATIENT
Start: 2024-02-09

## 2024-02-09 NOTE — TELEPHONE ENCOUNTER
Future Appointments    Encounter Information   Provider Department Appt Notes   4/19/2024 Zoltan Moody MD [facility] Primary Care 6 mo f/u     Past Visits    Date Provider Specialty Visit Type Primary Dx   10/19/2023 Zoltan Moody MD Family Medicine Office Visit Hyperlipidemia, unspecified hyperlipidemia type

## 2024-04-25 ENCOUNTER — TELEPHONE (OUTPATIENT)
Dept: FAMILY MEDICINE CLINIC | Age: 79
End: 2024-04-25

## 2024-04-25 NOTE — TELEPHONE ENCOUNTER
Called to complete medicare annual wellness visit. No answer. Left a message, if calls back feel free to put on my schedule.

## 2024-05-01 ASSESSMENT — PATIENT HEALTH QUESTIONNAIRE - PHQ9
SUM OF ALL RESPONSES TO PHQ QUESTIONS 1-9: 0
SUM OF ALL RESPONSES TO PHQ9 QUESTIONS 1 & 2: 0
SUM OF ALL RESPONSES TO PHQ QUESTIONS 1-9: 0
SUM OF ALL RESPONSES TO PHQ9 QUESTIONS 1 & 2: 0
2. FEELING DOWN, DEPRESSED OR HOPELESS: NOT AT ALL
2. FEELING DOWN, DEPRESSED OR HOPELESS: NOT AT ALL
1. LITTLE INTEREST OR PLEASURE IN DOING THINGS: NOT AT ALL
1. LITTLE INTEREST OR PLEASURE IN DOING THINGS: NOT AT ALL
SUM OF ALL RESPONSES TO PHQ QUESTIONS 1-9: 0
SUM OF ALL RESPONSES TO PHQ QUESTIONS 1-9: 0

## 2024-05-03 ENCOUNTER — OFFICE VISIT (OUTPATIENT)
Dept: FAMILY MEDICINE CLINIC | Age: 79
End: 2024-05-03
Payer: MEDICARE

## 2024-05-03 VITALS
HEART RATE: 61 BPM | BODY MASS INDEX: 26.07 KG/M2 | TEMPERATURE: 97.5 F | WEIGHT: 172 LBS | DIASTOLIC BLOOD PRESSURE: 68 MMHG | SYSTOLIC BLOOD PRESSURE: 136 MMHG | OXYGEN SATURATION: 97 % | HEIGHT: 68 IN

## 2024-05-03 DIAGNOSIS — M19.90 ARTHRITIS: ICD-10-CM

## 2024-05-03 DIAGNOSIS — I10 HYPERTENSION, UNSPECIFIED TYPE: ICD-10-CM

## 2024-05-03 DIAGNOSIS — K21.9 GASTROESOPHAGEAL REFLUX DISEASE WITHOUT ESOPHAGITIS: ICD-10-CM

## 2024-05-03 DIAGNOSIS — E78.5 HYPERLIPIDEMIA, UNSPECIFIED HYPERLIPIDEMIA TYPE: Primary | ICD-10-CM

## 2024-05-03 DIAGNOSIS — Z12.5 SCREENING PSA (PROSTATE SPECIFIC ANTIGEN): ICD-10-CM

## 2024-05-03 DIAGNOSIS — G89.29 CHRONIC MUSCULOSKELETAL PAIN: ICD-10-CM

## 2024-05-03 DIAGNOSIS — M79.18 CHRONIC MUSCULOSKELETAL PAIN: ICD-10-CM

## 2024-05-03 LAB
ALBUMIN SERPL-MCNC: 4.4 G/DL (ref 3.5–4.6)
ALP SERPL-CCNC: 46 U/L (ref 35–104)
ALT SERPL-CCNC: 13 U/L (ref 0–41)
ANION GAP SERPL CALCULATED.3IONS-SCNC: 13 MEQ/L (ref 9–15)
AST SERPL-CCNC: 19 U/L (ref 0–40)
BILIRUB SERPL-MCNC: 0.4 MG/DL (ref 0.2–0.7)
BUN SERPL-MCNC: 20 MG/DL (ref 8–23)
CALCIUM SERPL-MCNC: 9.4 MG/DL (ref 8.5–9.9)
CHLORIDE SERPL-SCNC: 106 MEQ/L (ref 95–107)
CHOLEST SERPL-MCNC: 151 MG/DL (ref 0–199)
CO2 SERPL-SCNC: 23 MEQ/L (ref 20–31)
CREAT SERPL-MCNC: 0.8 MG/DL (ref 0.7–1.2)
ERYTHROCYTE [DISTWIDTH] IN BLOOD BY AUTOMATED COUNT: 13.7 % (ref 11.5–14.5)
GLOBULIN SER CALC-MCNC: 2.7 G/DL (ref 2.3–3.5)
GLUCOSE SERPL-MCNC: 98 MG/DL (ref 70–99)
HCT VFR BLD AUTO: 38.5 % (ref 42–52)
HDLC SERPL-MCNC: 52 MG/DL (ref 40–59)
HGB BLD-MCNC: 13.3 G/DL (ref 14–18)
LDLC SERPL CALC-MCNC: 80 MG/DL (ref 0–129)
MCH RBC QN AUTO: 30.9 PG (ref 27–31.3)
MCHC RBC AUTO-ENTMCNC: 34.5 % (ref 33–37)
MCV RBC AUTO: 89.3 FL (ref 79–92.2)
PLATELET # BLD AUTO: 213 K/UL (ref 130–400)
POTASSIUM SERPL-SCNC: 4.2 MEQ/L (ref 3.4–4.9)
PROT SERPL-MCNC: 7.1 G/DL (ref 6.3–8)
PSA SERPL-MCNC: 0.87 NG/ML (ref 0–4)
RBC # BLD AUTO: 4.31 M/UL (ref 4.7–6.1)
SODIUM SERPL-SCNC: 142 MEQ/L (ref 135–144)
TRIGL SERPL-MCNC: 94 MG/DL (ref 0–150)
WBC # BLD AUTO: 7.2 K/UL (ref 4.8–10.8)

## 2024-05-03 PROCEDURE — 3075F SYST BP GE 130 - 139MM HG: CPT | Performed by: FAMILY MEDICINE

## 2024-05-03 PROCEDURE — G8427 DOCREV CUR MEDS BY ELIG CLIN: HCPCS | Performed by: FAMILY MEDICINE

## 2024-05-03 PROCEDURE — 4004F PT TOBACCO SCREEN RCVD TLK: CPT | Performed by: FAMILY MEDICINE

## 2024-05-03 PROCEDURE — 1123F ACP DISCUSS/DSCN MKR DOCD: CPT | Performed by: FAMILY MEDICINE

## 2024-05-03 PROCEDURE — 3078F DIAST BP <80 MM HG: CPT | Performed by: FAMILY MEDICINE

## 2024-05-03 PROCEDURE — 99214 OFFICE O/P EST MOD 30 MIN: CPT | Performed by: FAMILY MEDICINE

## 2024-05-03 PROCEDURE — G8417 CALC BMI ABV UP PARAM F/U: HCPCS | Performed by: FAMILY MEDICINE

## 2024-05-03 NOTE — PROGRESS NOTES
Chief Complaint   Patient presents with    Hypertension     Check up, going to [place] tomorrow     Hearing Problem     Starting to not hear as well      Yaw Rivera is a 78 y.o. male    Review 3 chronic issues    hyperlipid  Ok on lipitor    Sleeps ok with tylenol and ibuprofen     Ongoing pain complaints  Hands stiff/hurt  Seeing rheumatology     Arthritis pain, worse with cold weather     Had previous surgery on his left arm  Having pain/locks up when he reaches behind    Issues with gabapentin and lyrica in past (intolerance)    Wt Readings from Last 3 Encounters:   05/03/24 78 kg (172 lb)   10/19/23 74.4 kg (164 lb)   05/08/23 81.6 kg (180 lb)         Patient Active Problem List   Diagnosis    GERD (gastroesophageal reflux disease)    Hyperlipidemia    Hypertension    Chronic nasal congestion    Prostatitis    Low back pain    Hand pain    High risk medication use - OARRS PM&R 5/9/17, 07/12/17 OARRS PM&R, 01/24/17 Med Contract PM&R, 07/13/17 Urine Drug Screen: negative PM&R    DDD (degenerative disc disease), lumbar    Myalgia    Sciatic pain    Greater trochanteric bursitis    C7 radiculopathy    Bilateral carpal tunnel syndrome    Ulnar neuropathy of left upper extremity    Lateral epicondylitis (tennis elbow)    Cervical radiculopathy at C8    Displacement of cervical intervertebral disc without myelopathy    Displacement of lumbar intervertebral disc without myelopathy    Primary localized osteoarthrosis, other specified sites    Patient overweight    Lumbosacral spondylosis without myelopathy    Cervical spondylosis without myelopathy    Excess weight    Anxiety    Tardy ulnar nerve palsy    Actinic keratoses    RLS (restless legs syndrome)    Herpes labialis    Pneumonia    Chronic anemia    Opioid dependence with current use (HCC)       Current Outpatient Medications   Medication Sig Dispense Refill    atorvastatin (LIPITOR) 20 MG tablet TAKE 1 TABLET BY MOUTH ONCE  DAILY 90 tablet 3    omeprazole 
00:56

## 2024-10-22 DIAGNOSIS — I10 HYPERTENSION, UNSPECIFIED TYPE: ICD-10-CM

## 2024-10-22 RX ORDER — OLMESARTAN MEDOXOMIL 40 MG/1
40 TABLET ORAL EVERY MORNING
Qty: 90 TABLET | Refills: 3 | Status: SHIPPED | OUTPATIENT
Start: 2024-10-22

## 2024-10-22 NOTE — TELEPHONE ENCOUNTER
Comments:     Last Office Visit (last PCP visit):   5/3/2024    Next Visit Date:  Future Appointments   Date Time Provider Department Center   11/4/2024  1:15 PM Zoltan Moody MD [facility] DE[facility]       **If hasn't been seen in over a year OR hasn't followed up according to last diabetes/ADHD visit, make appointment for patient before sending refill to provider.    Rx requested:  Requested Prescriptions     Pending Prescriptions Disp Refills    olmesartan (BENICAR) 40 MG tablet 90 tablet 3     Sig: Take 1 tablet by mouth every morning

## 2024-11-04 ENCOUNTER — OFFICE VISIT (OUTPATIENT)
Dept: FAMILY MEDICINE CLINIC | Age: 79
End: 2024-11-04

## 2024-11-04 VITALS
BODY MASS INDEX: 25.98 KG/M2 | SYSTOLIC BLOOD PRESSURE: 128 MMHG | DIASTOLIC BLOOD PRESSURE: 66 MMHG | OXYGEN SATURATION: 96 % | HEIGHT: 68 IN | TEMPERATURE: 97.4 F | WEIGHT: 171.4 LBS | HEART RATE: 65 BPM

## 2024-11-04 DIAGNOSIS — K21.9 GASTROESOPHAGEAL REFLUX DISEASE WITHOUT ESOPHAGITIS: ICD-10-CM

## 2024-11-04 DIAGNOSIS — I10 HYPERTENSION, UNSPECIFIED TYPE: ICD-10-CM

## 2024-11-04 DIAGNOSIS — Z00.00 MEDICARE ANNUAL WELLNESS VISIT, SUBSEQUENT: Primary | ICD-10-CM

## 2024-11-04 DIAGNOSIS — Z23 NEEDS FLU SHOT: ICD-10-CM

## 2024-11-04 PROBLEM — F11.20 OPIOID DEPENDENCE WITH CURRENT USE (HCC): Status: RESOLVED | Noted: 2023-02-20 | Resolved: 2024-11-04

## 2024-11-04 SDOH — ECONOMIC STABILITY: FOOD INSECURITY: WITHIN THE PAST 12 MONTHS, YOU WORRIED THAT YOUR FOOD WOULD RUN OUT BEFORE YOU GOT MONEY TO BUY MORE.: NEVER TRUE

## 2024-11-04 SDOH — ECONOMIC STABILITY: FOOD INSECURITY: WITHIN THE PAST 12 MONTHS, THE FOOD YOU BOUGHT JUST DIDN'T LAST AND YOU DIDN'T HAVE MONEY TO GET MORE.: NEVER TRUE

## 2024-11-04 SDOH — ECONOMIC STABILITY: INCOME INSECURITY: HOW HARD IS IT FOR YOU TO PAY FOR THE VERY BASICS LIKE FOOD, HOUSING, MEDICAL CARE, AND HEATING?: NOT HARD AT ALL

## 2024-11-04 ASSESSMENT — PATIENT HEALTH QUESTIONNAIRE - PHQ9
SUM OF ALL RESPONSES TO PHQ QUESTIONS 1-9: 1
SUM OF ALL RESPONSES TO PHQ9 QUESTIONS 1 & 2: 1
SUM OF ALL RESPONSES TO PHQ QUESTIONS 1-9: 1
1. LITTLE INTEREST OR PLEASURE IN DOING THINGS: NOT AT ALL
2. FEELING DOWN, DEPRESSED OR HOPELESS: SEVERAL DAYS

## 2024-11-04 ASSESSMENT — LIFESTYLE VARIABLES
HOW MANY STANDARD DRINKS CONTAINING ALCOHOL DO YOU HAVE ON A TYPICAL DAY: PATIENT DOES NOT DRINK
HOW OFTEN DO YOU HAVE A DRINK CONTAINING ALCOHOL: NEVER

## 2024-11-04 NOTE — PROGRESS NOTES
After obtaining consent, and per orders of Dr. Moody, injection of flu given in Right deltoid by Monica Napier CMA ([facility]). Patient instructed to remain in clinic for 20 minutes afterwards, and to report any adverse reaction to me immediately.

## 2024-11-04 NOTE — PROGRESS NOTES
Medicare Annual Wellness Visit    Yaw Rivera is here for Medicare AWV    Assessment & Plan   Medicare annual wellness visit, subsequent  Hypertension, unspecified type  Gastroesophageal reflux disease without esophagitis  Needs flu shot  -     Influenza, FLUAD Trivalent, (age 65 y+), IM, Preservative Free, 0.5mL    Recommendations for Preventive Services Due: see orders and patient instructions/AVS.  Recommended screening schedule for the next 5-10 years is provided to the patient in written form: see Patient Instructions/AVS.     No follow-ups on file.     Subjective   Chief Complaint   Patient presents with    Medicare AWV       Patient's complete Health Risk Assessment and screening values have been reviewed and are found in Flowsheets. The following problems were reviewed today and where indicated follow up appointments were made and/or referrals ordered.    Positive Risk Factor Screenings with Interventions:       Cognitive:   Clock Drawing Test (CDT): Normal  Words recalled: 0 Words Recalled  Total Score: (!) 2  Total Score Interpretation: Abnormal Mini-Cog  Interventions:  See AVS for additional education material  See A/P for plan and any pertinent orders           General HRA Questions:  Select all that apply: (!) New or Increased Pain  Interventions - Pain:  See AVS for additional education material  See A/P for plan and any pertinent orders      Inactivity:  On average, how many days per week do you engage in moderate to strenuous exercise (like a brisk walk)?: 0 days (!) Abnormal  On average, how many minutes do you engage in exercise at this level?: 0 min  Interventions:  See AVS for additional education material  See A/P for plan and any pertinent orders    Poor Eating Habits/Diet:  Do you eat balanced/healthy meals regularly?: (!) No  Interventions:  See AVS for additional education material  See A/P for plan and any pertinent orders     Dentist Screen:  Have you seen the dentist within the

## 2024-11-04 NOTE — PATIENT INSTRUCTIONS
shoulders or arms.     Lightheadedness or sudden weakness.     A fast or irregular heartbeat.   After you call 911, the  may tell you to chew 1 adult-strength or 2 to 4 low-dose aspirin. Wait for an ambulance. Do not try to drive yourself.  Watch closely for changes in your health, and be sure to contact your doctor if you have any problems.  Where can you learn more?  Go to https://www.[employer].net/patientEd and enter F075 to learn more about \"A Healthy Heart: Care Instructions.\"  Current as of: June 24, 2023  Content Version: 14.2  © 2024 [employer].   Care instructions adapted under license by [employer]. If you have questions about a medical condition or this instruction, always ask your healthcare professional. [employer] disclaims any warranty or liability for your use of this information.      Personalized Preventive Plan for Yaw Rivera - 11/4/2024  Medicare offers a range of preventive health benefits. Some of the tests and screenings are paid in full while other may be subject to a deductible, co-insurance, and/or copay.    Some of these benefits include a comprehensive review of your medical history including lifestyle, illnesses that may run in your family, and various assessments and screenings as appropriate.    After reviewing your medical record and screening and assessments performed today your provider may have ordered immunizations, labs, imaging, and/or referrals for you.  A list of these orders (if applicable) as well as your Preventive Care list are included within your After Visit Summary for your review.    Other Preventive Recommendations:    A preventive eye exam performed by an eye specialist is recommended every 1-2 years to screen for glaucoma; cataracts, macular degeneration, and other eye disorders.  A preventive dental visit is recommended every 6 months.  Try to get at least 150 minutes of exercise per week or 10,000 steps per day on a

## 2024-12-19 DIAGNOSIS — E78.5 HYPERLIPIDEMIA, UNSPECIFIED HYPERLIPIDEMIA TYPE: ICD-10-CM

## 2024-12-19 DIAGNOSIS — K21.9 GASTROESOPHAGEAL REFLUX DISEASE WITHOUT ESOPHAGITIS: ICD-10-CM

## 2024-12-20 RX ORDER — OMEPRAZOLE 40 MG/1
40 CAPSULE, DELAYED RELEASE ORAL DAILY
Qty: 90 CAPSULE | Refills: 3 | Status: SHIPPED | OUTPATIENT
Start: 2024-12-20

## 2024-12-20 RX ORDER — ATORVASTATIN CALCIUM 20 MG/1
20 TABLET, FILM COATED ORAL DAILY
Qty: 90 TABLET | Refills: 3 | Status: SHIPPED | OUTPATIENT
Start: 2024-12-20

## 2024-12-20 NOTE — TELEPHONE ENCOUNTER
Comments:     Last Office Visit (last PCP visit):   11/4/2024    Next Visit Date:  Future Appointments   Date Time Provider Department Center   5/5/2025  2:30 PM Zoltan Moody MD [facility]       **If hasn't been seen in over a year OR hasn't followed up according to last diabetes/ADHD visit, make appointment for patient before sending refill to provider.    Rx requested:  Requested Prescriptions     Pending Prescriptions Disp Refills    omeprazole (PRILOSEC) 40 MG delayed release capsule [Pharmacy Med Name: Omeprazole 40 MG Oral Capsule Delayed Release] 90 capsule 3     Sig: TAKE 1 CAPSULE BY MOUTH DAILY    atorvastatin (LIPITOR) 20 MG tablet [Pharmacy Med Name: Atorvastatin Calcium 20 MG Oral Tablet] 90 tablet 3     Sig: TAKE 1 TABLET BY MOUTH ONCE  DAILY

## 2025-09-02 ENCOUNTER — TELEMEDICINE (OUTPATIENT)
Age: 80
End: 2025-09-02
Payer: MEDICARE

## 2025-09-02 DIAGNOSIS — Z00.00 MEDICARE ANNUAL WELLNESS VISIT, SUBSEQUENT: Primary | ICD-10-CM

## 2025-09-02 PROCEDURE — G0439 PPPS, SUBSEQ VISIT: HCPCS | Performed by: NURSE PRACTITIONER

## 2025-09-02 PROCEDURE — 1123F ACP DISCUSS/DSCN MKR DOCD: CPT | Performed by: NURSE PRACTITIONER

## 2025-09-03 ASSESSMENT — PATIENT HEALTH QUESTIONNAIRE - PHQ9
SUM OF ALL RESPONSES TO PHQ QUESTIONS 1-9: 1
2. FEELING DOWN, DEPRESSED OR HOPELESS: SEVERAL DAYS
1. LITTLE INTEREST OR PLEASURE IN DOING THINGS: NOT AT ALL
SUM OF ALL RESPONSES TO PHQ QUESTIONS 1-9: 1

## 2025-09-03 ASSESSMENT — LIFESTYLE VARIABLES
HOW OFTEN DO YOU HAVE A DRINK CONTAINING ALCOHOL: MONTHLY OR LESS
HOW MANY STANDARD DRINKS CONTAINING ALCOHOL DO YOU HAVE ON A TYPICAL DAY: 1 OR 2

## (undated) DEVICE — SUTURE VCRL + SZ 2-0 L36IN ABSRB UD L36MM CT-1 1/2 CIR VCP945H

## (undated) DEVICE — STERILE LATEX POWDER-FREE SURGICAL GLOVESWITH NITRILE COATING: Brand: PROTEXIS

## (undated) DEVICE — BANDAGE,GAUZE,CONFORMING,4"X75",STRL,LF: Brand: MEDLINE

## (undated) DEVICE — SUTURE VCRL + SZ 4-0 L18IN ABSRB UD L19MM PS-2 3/8 CIR PRIM VCP496H

## (undated) DEVICE — 3M™ STERI-DRAPE™ INSTRUMENT POUCH 1018: Brand: STERI-DRAPE™

## (undated) DEVICE — GOWN,AURORA,NONREINFORCED,LARGE: Brand: MEDLINE

## (undated) DEVICE — INTENDED TO BE USED TO OCCLUDE, RETRACT AND IDENTIFY ARTERIES, VEINS, TENDONS AND NERVES IN SURGICAL PROCEDURES: Brand: STERION®  VESSEL LOOP

## (undated) DEVICE — SUTURE VCRL + SZ 3-0 L18IN ABSRB UD PS-2 3/8 CIR REV CUT VCP497H

## (undated) DEVICE — DRAPE SURGICAL HAND PROX AURORA

## (undated) DEVICE — ELECTRODE PT RET AD L9FT HI MOIST COND ADH HYDRGEL CORDED

## (undated) DEVICE — INTENDED FOR TISSUE SEPARATION, AND OTHER PROCEDURES THAT REQUIRE A SHARP SURGICAL BLADE TO PUNCTURE OR CUT.: Brand: BARD-PARKER ® CARBON RIB-BACK BLADES

## (undated) DEVICE — GAUZE SPONGES,12 PLY: Brand: CURITY

## (undated) DEVICE — HYPODERMIC SAFETY NEEDLE: Brand: MAGELLAN

## (undated) DEVICE — PENCIL ES L3M BTTN SWCH HOLSTER W/ BLDE ELECTRD EDGE

## (undated) DEVICE — X-RAY DETECTABLE SPONGES,16 PLY: Brand: VISTEC

## (undated) DEVICE — SHEET,DRAPE,53X77,STERILE: Brand: MEDLINE

## (undated) DEVICE — SKIN MARKER,REGULAR TIP WITH RULER: Brand: DEVON

## (undated) DEVICE — PACK,SET UP,DRAPE: Brand: MEDLINE

## (undated) DEVICE — Z DISCONTINUED APPLICATOR SURG PREP 0.35OZ 2% CHG 70% ISO ALC W/ HI LT

## (undated) DEVICE — SYRINGE BLB 50CC IRRIG PLIABLE FNGR FLNG GRAD FLSK DISP

## (undated) DEVICE — MEDI-VAC NON-CONDUCTIVE SUCTION TUBING: Brand: CARDINAL HEALTH

## (undated) DEVICE — 2000CC GUARDIAN II: Brand: GUARDIAN

## (undated) DEVICE — STOCKINETTE: Brand: DEROYAL

## (undated) DEVICE — DISCONTINUED USE 393278 SYRINGE 10 ML HYPO W/O NDL LL TP PLSTC ST

## (undated) DEVICE — LABEL MED MINI W/ MARKER

## (undated) DEVICE — 1842 FOAM BLOCK NEEDLE COUNTER: Brand: DEVON

## (undated) DEVICE — CORD BPLR 2 PIN FLAT AND RND DISP

## (undated) DEVICE — PAD,NON-ADHERENT,3X8,STERILE,LF,1/PK: Brand: MEDLINE